# Patient Record
Sex: FEMALE | Race: WHITE | NOT HISPANIC OR LATINO | Employment: OTHER | URBAN - METROPOLITAN AREA
[De-identification: names, ages, dates, MRNs, and addresses within clinical notes are randomized per-mention and may not be internally consistent; named-entity substitution may affect disease eponyms.]

---

## 2017-11-20 ENCOUNTER — APPOINTMENT (OUTPATIENT)
Dept: RADIOLOGY | Facility: CLINIC | Age: 77
End: 2017-11-20
Payer: MEDICARE

## 2017-11-20 ENCOUNTER — TRANSCRIBE ORDERS (OUTPATIENT)
Dept: RADIOLOGY | Facility: CLINIC | Age: 77
End: 2017-11-20

## 2017-11-20 DIAGNOSIS — R52 PAIN: Primary | ICD-10-CM

## 2017-11-20 DIAGNOSIS — R52 PAIN: ICD-10-CM

## 2017-11-20 PROCEDURE — 71101 X-RAY EXAM UNILAT RIBS/CHEST: CPT

## 2021-01-22 ENCOUNTER — IMMUNIZATIONS (OUTPATIENT)
Dept: FAMILY MEDICINE CLINIC | Facility: HOSPITAL | Age: 81
End: 2021-01-22

## 2021-01-22 DIAGNOSIS — Z23 ENCOUNTER FOR IMMUNIZATION: Primary | ICD-10-CM

## 2021-01-22 PROCEDURE — 91300 SARS-COV-2 / COVID-19 MRNA VACCINE (PFIZER-BIONTECH) 30 MCG: CPT

## 2021-01-22 PROCEDURE — 0001A SARS-COV-2 / COVID-19 MRNA VACCINE (PFIZER-BIONTECH) 30 MCG: CPT

## 2021-02-10 ENCOUNTER — IMMUNIZATIONS (OUTPATIENT)
Dept: FAMILY MEDICINE CLINIC | Facility: HOSPITAL | Age: 81
End: 2021-02-10

## 2021-02-10 DIAGNOSIS — Z23 ENCOUNTER FOR IMMUNIZATION: Primary | ICD-10-CM

## 2021-02-10 PROCEDURE — 91300 SARS-COV-2 / COVID-19 MRNA VACCINE (PFIZER-BIONTECH) 30 MCG: CPT

## 2021-02-10 PROCEDURE — 0002A SARS-COV-2 / COVID-19 MRNA VACCINE (PFIZER-BIONTECH) 30 MCG: CPT

## 2021-02-17 ENCOUNTER — TRANSCRIBE ORDERS (OUTPATIENT)
Dept: ADMINISTRATIVE | Facility: HOSPITAL | Age: 81
End: 2021-02-17

## 2021-02-17 DIAGNOSIS — R52 PAIN: Primary | ICD-10-CM

## 2021-09-17 ENCOUNTER — PREPPED CHART (OUTPATIENT)
Dept: URBAN - METROPOLITAN AREA CLINIC 6 | Facility: CLINIC | Age: 81
End: 2021-09-17

## 2021-09-17 ENCOUNTER — ESTABLISHED COMPREHENSIVE EXAM (OUTPATIENT)
Dept: URBAN - METROPOLITAN AREA CLINIC 6 | Facility: CLINIC | Age: 81
End: 2021-09-17

## 2021-09-17 DIAGNOSIS — Z96.1: ICD-10-CM

## 2021-09-17 DIAGNOSIS — H25.812: ICD-10-CM

## 2021-09-17 DIAGNOSIS — H35.3210: ICD-10-CM

## 2021-09-17 DIAGNOSIS — E11.9: ICD-10-CM

## 2021-09-17 PROCEDURE — 92134 CPTRZ OPH DX IMG PST SGM RTA: CPT

## 2021-09-17 PROCEDURE — 2022F DILAT RTA XM EVC RTNOPTHY: CPT

## 2021-09-17 PROCEDURE — 3072F LOW RISK FOR RETINOPATHY: CPT

## 2021-09-17 PROCEDURE — 1036F TOBACCO NON-USER: CPT

## 2021-09-17 PROCEDURE — G8427 DOCREV CUR MEDS BY ELIG CLIN: HCPCS

## 2021-09-17 PROCEDURE — 92004 COMPRE OPH EXAM NEW PT 1/>: CPT

## 2021-09-17 ASSESSMENT — PACHYMETRY
OD_CT_UM: 18
OS_CT_UM: 18

## 2021-09-17 ASSESSMENT — VISUAL ACUITY
OS_CC: 20/100
OU_OTHER: @14""
OU_CC: J5+1
OD_CC: 20/100

## 2021-10-16 ENCOUNTER — HOSPITAL ENCOUNTER (EMERGENCY)
Facility: HOSPITAL | Age: 81
Discharge: HOME | End: 2021-10-16
Attending: EMERGENCY MEDICINE | Admitting: EMERGENCY MEDICINE
Payer: MEDICARE

## 2021-10-16 VITALS
TEMPERATURE: 97.7 F | OXYGEN SATURATION: 98 % | HEIGHT: 61 IN | BODY MASS INDEX: 27.56 KG/M2 | DIASTOLIC BLOOD PRESSURE: 74 MMHG | RESPIRATION RATE: 16 BRPM | WEIGHT: 146 LBS | SYSTOLIC BLOOD PRESSURE: 199 MMHG | HEART RATE: 77 BPM

## 2021-10-16 DIAGNOSIS — H53.9 VISUAL CHANGES: Primary | ICD-10-CM

## 2021-10-16 PROCEDURE — 99283 EMERGENCY DEPT VISIT LOW MDM: CPT

## 2021-10-16 PROCEDURE — 99284 EMERGENCY DEPT VISIT MOD MDM: CPT | Performed by: EMERGENCY MEDICINE

## 2021-10-16 RX ORDER — TETRACAINE HYDROCHLORIDE 5 MG/ML
1 SOLUTION OPHTHALMIC ONCE
Status: COMPLETED | OUTPATIENT
Start: 2021-10-16 | End: 2021-10-16

## 2021-10-16 RX ADMIN — TETRACAINE HYDROCHLORIDE 1 DROP: 5 SOLUTION OPHTHALMIC at 09:56

## 2021-10-16 RX ADMIN — FLUORESCEIN SODIUM 1 STRIP: 1 STRIP OPHTHALMIC at 09:56

## 2021-10-21 ENCOUNTER — SURGERY/PROCEDURE (OUTPATIENT)
Dept: URBAN - METROPOLITAN AREA SURGICAL CENTER 6 | Facility: SURGICAL CENTER | Age: 81
End: 2021-10-21

## 2021-10-21 DIAGNOSIS — H25.812: ICD-10-CM

## 2021-10-21 PROCEDURE — 66984 XCAPSL CTRC RMVL W/O ECP: CPT

## 2021-10-21 PROCEDURE — 0356T INSERTION OF DRUG-ELUTING IMPLANT (INCLUDING PUNCTAL DILATION AND IMPLANT REMOVAL WHEN PERFORMED) INTO LACRIMAL CANALICULUS, EACH: CPT

## 2021-10-22 ENCOUNTER — 1 DAY POST-OP (OUTPATIENT)
Dept: URBAN - METROPOLITAN AREA CLINIC 6 | Facility: CLINIC | Age: 81
End: 2021-10-22

## 2021-10-22 DIAGNOSIS — Z96.1: ICD-10-CM

## 2021-10-22 PROCEDURE — G8427 DOCREV CUR MEDS BY ELIG CLIN: HCPCS

## 2021-10-22 PROCEDURE — 99024 POSTOP FOLLOW-UP VISIT: CPT

## 2021-10-22 PROCEDURE — 1036F TOBACCO NON-USER: CPT

## 2021-10-22 ASSESSMENT — TONOMETRY
OD_IOP_MMHG: 11
OS_IOP_MMHG: 23

## 2021-10-22 ASSESSMENT — VISUAL ACUITY
OS_PH: 20/25
OS_SC: 20/30

## 2021-10-29 ENCOUNTER — 1 WEEK POST-OP (OUTPATIENT)
Dept: URBAN - METROPOLITAN AREA CLINIC 6 | Facility: CLINIC | Age: 81
End: 2021-10-29

## 2021-10-29 DIAGNOSIS — Z96.1: ICD-10-CM

## 2021-10-29 PROCEDURE — 99024 POSTOP FOLLOW-UP VISIT: CPT

## 2021-10-29 PROCEDURE — 1036F TOBACCO NON-USER: CPT

## 2021-10-29 PROCEDURE — G8427 DOCREV CUR MEDS BY ELIG CLIN: HCPCS

## 2021-10-29 ASSESSMENT — TONOMETRY
OD_IOP_MMHG: 10
OS_IOP_MMHG: 21

## 2021-10-29 ASSESSMENT — VISUAL ACUITY
OU_SC: J1
OS_SC: 20/50+2
OS_PH: 20/30+1

## 2021-11-18 ENCOUNTER — RX CHECK (OUTPATIENT)
Dept: URBAN - METROPOLITAN AREA CLINIC 6 | Facility: CLINIC | Age: 81
End: 2021-11-18

## 2021-11-18 DIAGNOSIS — H52.12: ICD-10-CM

## 2021-11-18 DIAGNOSIS — H52.4: ICD-10-CM

## 2021-11-18 DIAGNOSIS — H52.222: ICD-10-CM

## 2021-11-18 PROCEDURE — 99024 POSTOP FOLLOW-UP VISIT: CPT

## 2021-11-18 PROCEDURE — 92015 DETERMINE REFRACTIVE STATE: CPT

## 2021-11-18 PROCEDURE — 1036F TOBACCO NON-USER: CPT

## 2021-11-18 PROCEDURE — G8428 CUR MEDS NOT DOCUMENT: HCPCS

## 2021-11-18 ASSESSMENT — VISUAL ACUITY: OS_SC: 20/40+1

## 2021-12-08 ENCOUNTER — PROBLEM (OUTPATIENT)
Dept: URBAN - METROPOLITAN AREA CLINIC 6 | Facility: CLINIC | Age: 81
End: 2021-12-08

## 2021-12-08 DIAGNOSIS — H15.102: ICD-10-CM

## 2021-12-08 PROCEDURE — 1036F TOBACCO NON-USER: CPT

## 2021-12-08 PROCEDURE — 92012 INTRM OPH EXAM EST PATIENT: CPT

## 2021-12-08 PROCEDURE — G8427 DOCREV CUR MEDS BY ELIG CLIN: HCPCS

## 2021-12-08 ASSESSMENT — VISUAL ACUITY
OS_SC: 20/70+1
OD_SC: CF 1FT
OS_PH: 20/40-1

## 2021-12-08 ASSESSMENT — TONOMETRY
OD_IOP_MMHG: 13
OS_IOP_MMHG: 13

## 2021-12-17 ENCOUNTER — FOLLOW UP (OUTPATIENT)
Dept: URBAN - METROPOLITAN AREA CLINIC 6 | Facility: CLINIC | Age: 81
End: 2021-12-17

## 2021-12-17 DIAGNOSIS — B00.52: ICD-10-CM

## 2021-12-17 PROCEDURE — 1036F TOBACCO NON-USER: CPT

## 2021-12-17 PROCEDURE — G8427 DOCREV CUR MEDS BY ELIG CLIN: HCPCS

## 2021-12-17 PROCEDURE — 92012 INTRM OPH EXAM EST PATIENT: CPT

## 2021-12-17 ASSESSMENT — TONOMETRY
OD_IOP_MMHG: 10
OS_IOP_MMHG: 11

## 2021-12-17 ASSESSMENT — VISUAL ACUITY
OU_SC: J2
OS_CC: 20/30-1

## 2021-12-21 ENCOUNTER — FOLLOW UP (OUTPATIENT)
Dept: URBAN - METROPOLITAN AREA CLINIC 6 | Facility: CLINIC | Age: 81
End: 2021-12-21

## 2021-12-21 DIAGNOSIS — B00.52: ICD-10-CM

## 2021-12-21 PROCEDURE — G8427 DOCREV CUR MEDS BY ELIG CLIN: HCPCS

## 2021-12-21 PROCEDURE — 1036F TOBACCO NON-USER: CPT

## 2021-12-21 PROCEDURE — 92012 INTRM OPH EXAM EST PATIENT: CPT

## 2021-12-21 ASSESSMENT — VISUAL ACUITY
OS_SC: 20/60
OD_SC: CF 2FT
OU_SC: J1
OS_PH: 20/40

## 2021-12-21 ASSESSMENT — TONOMETRY
OS_IOP_MMHG: 13
OD_IOP_MMHG: 12

## 2021-12-28 ENCOUNTER — FOLLOW UP (OUTPATIENT)
Dept: URBAN - METROPOLITAN AREA CLINIC 6 | Facility: CLINIC | Age: 81
End: 2021-12-28

## 2021-12-28 DIAGNOSIS — B00.52: ICD-10-CM

## 2021-12-28 PROCEDURE — G8427 DOCREV CUR MEDS BY ELIG CLIN: HCPCS

## 2021-12-28 PROCEDURE — 1036F TOBACCO NON-USER: CPT

## 2021-12-28 PROCEDURE — 92012 INTRM OPH EXAM EST PATIENT: CPT

## 2021-12-28 ASSESSMENT — VISUAL ACUITY
OD_SC: CF 1FT
OS_PH: 20/25
OS_SC: 20/40

## 2021-12-28 ASSESSMENT — TONOMETRY
OS_IOP_MMHG: 16
OD_IOP_MMHG: 16

## 2022-01-04 ENCOUNTER — FOLLOW UP (OUTPATIENT)
Dept: URBAN - METROPOLITAN AREA CLINIC 6 | Facility: CLINIC | Age: 82
End: 2022-01-04

## 2022-01-04 DIAGNOSIS — B00.52: ICD-10-CM

## 2022-01-04 PROCEDURE — 92012 INTRM OPH EXAM EST PATIENT: CPT

## 2022-01-04 ASSESSMENT — TONOMETRY
OD_IOP_MMHG: 13
OS_IOP_MMHG: 11

## 2022-01-04 ASSESSMENT — VISUAL ACUITY
OS_CC: 20/40
OU_SC: J2
OS_PH: 20/30-1
OD_CC: CF 1FT

## 2022-01-19 ENCOUNTER — FOLLOW UP (OUTPATIENT)
Dept: URBAN - METROPOLITAN AREA CLINIC 6 | Facility: CLINIC | Age: 82
End: 2022-01-19

## 2022-01-19 DIAGNOSIS — Z96.1: ICD-10-CM

## 2022-01-19 DIAGNOSIS — E11.9: ICD-10-CM

## 2022-01-19 DIAGNOSIS — B00.52: ICD-10-CM

## 2022-01-19 PROCEDURE — 92012 INTRM OPH EXAM EST PATIENT: CPT

## 2022-01-19 ASSESSMENT — TONOMETRY
OS_IOP_MMHG: 13
OD_IOP_MMHG: 15

## 2022-01-19 ASSESSMENT — VISUAL ACUITY
OS_PH: 20/40+1
OU_SC: J1
OD_SC: CF 1FT
OS_SC: 20/40

## 2022-01-27 ENCOUNTER — FOLLOW UP (OUTPATIENT)
Dept: URBAN - METROPOLITAN AREA CLINIC 6 | Facility: CLINIC | Age: 82
End: 2022-01-27

## 2022-01-27 DIAGNOSIS — B00.52: ICD-10-CM

## 2022-01-27 DIAGNOSIS — Z96.1: ICD-10-CM

## 2022-01-27 DIAGNOSIS — H04.123: ICD-10-CM

## 2022-01-27 DIAGNOSIS — E11.9: ICD-10-CM

## 2022-01-27 PROCEDURE — 92012 INTRM OPH EXAM EST PATIENT: CPT

## 2022-01-27 ASSESSMENT — TONOMETRY
OS_IOP_MMHG: 14
OD_IOP_MMHG: 13

## 2022-01-27 ASSESSMENT — VISUAL ACUITY
OD_SC: CF 2FT
OS_SC: 20/40+1

## 2022-02-04 ENCOUNTER — FOLLOW UP (OUTPATIENT)
Dept: URBAN - METROPOLITAN AREA CLINIC 6 | Facility: CLINIC | Age: 82
End: 2022-02-04

## 2022-02-04 DIAGNOSIS — H04.123: ICD-10-CM

## 2022-02-04 DIAGNOSIS — B00.52: ICD-10-CM

## 2022-02-04 PROCEDURE — 92012 INTRM OPH EXAM EST PATIENT: CPT

## 2022-02-04 ASSESSMENT — TONOMETRY
OS_IOP_MMHG: 11
OD_IOP_MMHG: 13

## 2022-02-04 ASSESSMENT — VISUAL ACUITY
OS_PH: 20/25
OS_SC: 20/40

## 2022-03-01 ENCOUNTER — FOLLOW UP (OUTPATIENT)
Dept: URBAN - METROPOLITAN AREA CLINIC 6 | Facility: CLINIC | Age: 82
End: 2022-03-01

## 2022-03-01 DIAGNOSIS — H04.123: ICD-10-CM

## 2022-03-01 PROCEDURE — 92012 INTRM OPH EXAM EST PATIENT: CPT

## 2022-03-01 ASSESSMENT — TONOMETRY
OS_IOP_MMHG: 18
OD_IOP_MMHG: 18

## 2022-03-01 ASSESSMENT — VISUAL ACUITY
OS_PH: 20/30
OU_CC: J1+
OD_SC: CF 2FT
OS_SC: 20/40

## 2022-06-16 ENCOUNTER — PROBLEM (OUTPATIENT)
Dept: URBAN - METROPOLITAN AREA CLINIC 6 | Facility: CLINIC | Age: 82
End: 2022-06-16

## 2022-06-16 DIAGNOSIS — B00.52: ICD-10-CM

## 2022-06-16 DIAGNOSIS — H04.123: ICD-10-CM

## 2022-06-16 PROCEDURE — 92012 INTRM OPH EXAM EST PATIENT: CPT

## 2022-06-16 ASSESSMENT — TONOMETRY
OS_IOP_MMHG: 17
OD_IOP_MMHG: 19

## 2022-06-16 ASSESSMENT — VISUAL ACUITY
OD_SC: CF 2FT
OU_SC: J2
OS_SC: 20/40

## 2022-06-24 ENCOUNTER — FOLLOW UP (OUTPATIENT)
Dept: URBAN - METROPOLITAN AREA CLINIC 6 | Facility: CLINIC | Age: 82
End: 2022-06-24

## 2022-06-24 DIAGNOSIS — B00.52: ICD-10-CM

## 2022-06-24 DIAGNOSIS — H04.123: ICD-10-CM

## 2022-06-24 PROCEDURE — 92012 INTRM OPH EXAM EST PATIENT: CPT

## 2022-06-24 ASSESSMENT — VISUAL ACUITY
OU_SC: J1
OD_SC: CF 2FT
OS_SC: 20/40-2

## 2022-06-24 ASSESSMENT — TONOMETRY
OS_IOP_MMHG: 11
OD_IOP_MMHG: 17

## 2022-06-30 ENCOUNTER — FOLLOW UP (OUTPATIENT)
Dept: URBAN - METROPOLITAN AREA CLINIC 6 | Facility: CLINIC | Age: 82
End: 2022-06-30

## 2022-06-30 DIAGNOSIS — B00.52: ICD-10-CM

## 2022-06-30 DIAGNOSIS — H04.123: ICD-10-CM

## 2022-06-30 PROCEDURE — 92012 INTRM OPH EXAM EST PATIENT: CPT

## 2022-06-30 ASSESSMENT — TONOMETRY
OD_IOP_MMHG: 12
OS_IOP_MMHG: 10

## 2022-06-30 ASSESSMENT — VISUAL ACUITY
OD_SC: CF 2FT
OS_SC: 20/40-1

## 2022-08-04 ENCOUNTER — FOLLOW UP (OUTPATIENT)
Dept: URBAN - METROPOLITAN AREA CLINIC 6 | Facility: CLINIC | Age: 82
End: 2022-08-04

## 2022-08-04 DIAGNOSIS — B00.52: ICD-10-CM

## 2022-08-04 DIAGNOSIS — H04.123: ICD-10-CM

## 2022-08-04 DIAGNOSIS — Z96.1: ICD-10-CM

## 2022-08-04 DIAGNOSIS — E11.9: ICD-10-CM

## 2022-08-04 PROCEDURE — 92012 INTRM OPH EXAM EST PATIENT: CPT

## 2022-08-04 ASSESSMENT — VISUAL ACUITY
OS_SC: 20/40+2
OD_SC: CF 2FT

## 2022-08-04 ASSESSMENT — TONOMETRY
OD_IOP_MMHG: 13
OS_IOP_MMHG: 15

## 2022-10-24 ENCOUNTER — TRANSCRIBE ORDERS (OUTPATIENT)
Dept: PAIN MEDICINE | Facility: CLINIC | Age: 82
End: 2022-10-24

## 2022-11-16 ENCOUNTER — TELEPHONE (OUTPATIENT)
Dept: PAIN MEDICINE | Facility: CLINIC | Age: 82
End: 2022-11-16

## 2022-11-16 ENCOUNTER — HOSPITAL ENCOUNTER (OUTPATIENT)
Facility: HOSPITAL | Age: 82
Setting detail: OBSERVATION
Discharge: HOME | End: 2022-11-17
Attending: EMERGENCY MEDICINE | Admitting: INTERNAL MEDICINE

## 2022-11-16 ENCOUNTER — APPOINTMENT (EMERGENCY)
Dept: CT IMAGING | Facility: HOSPITAL | Age: 82
End: 2022-11-16

## 2022-11-16 DIAGNOSIS — R55 SYNCOPE: Primary | ICD-10-CM
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
## 2022-11-16 PROBLEM — I10 PRIMARY HYPERTENSION: Status: ACTIVE | Noted: 2022-11-16

## 2022-11-16 PROBLEM — E11.29 TYPE 2 DIABETES MELLITUS WITH RENAL COMPLICATION (HCC): Status: ACTIVE | Noted: 2022-11-16

## 2022-11-16 PROBLEM — R79.89 ELEVATED SERUM CREATININE: Status: ACTIVE | Noted: 2022-11-16

## 2022-11-16 LAB
2HR DELTA HS TROPONIN: 0 NG/L
4HR DELTA HS TROPONIN: 1 NG/L
ALBUMIN SERPL BCP-MCNC: 3.4 G/DL (ref 3.5–5)
ALP SERPL-CCNC: 35 U/L (ref 34–104)
ALT SERPL W P-5'-P-CCNC: 18 U/L (ref 7–52)
AMORPH URATE CRY URNS QL MICRO: ABNORMAL
ANION GAP SERPL CALCULATED.3IONS-SCNC: 8 MMOL/L (ref 4–13)
APTT PPP: 21 SECONDS (ref 23–37)
AST SERPL W P-5'-P-CCNC: 16 U/L (ref 13–39)
BACTERIA UR QL AUTO: ABNORMAL /HPF
BASOPHILS # BLD AUTO: 0.03 THOUSANDS/ÂΜL (ref 0–0.1)
BASOPHILS NFR BLD AUTO: 0 % (ref 0–1)
BILIRUB SERPL-MCNC: 0.6 MG/DL (ref 0.2–1)
BILIRUB UR QL STRIP: NEGATIVE
BUN SERPL-MCNC: 31 MG/DL (ref 5–25)
CALCIUM ALBUM COR SERPL-MCNC: 8.9 MG/DL (ref 8.3–10.1)
CALCIUM SERPL-MCNC: 8.4 MG/DL (ref 8.4–10.2)
CARDIAC TROPONIN I PNL SERPL HS: 4 NG/L
CARDIAC TROPONIN I PNL SERPL HS: 4 NG/L
CARDIAC TROPONIN I PNL SERPL HS: 5 NG/L
CHLORIDE SERPL-SCNC: 103 MMOL/L (ref 96–108)
CLARITY UR: ABNORMAL
CO2 SERPL-SCNC: 23 MMOL/L (ref 21–32)
COLOR UR: ABNORMAL
CREAT SERPL-MCNC: 1.41 MG/DL (ref 0.6–1.3)
D DIMER PPP FEU-MCNC: 2.92 UG/ML FEU
EOSINOPHIL # BLD AUTO: 0.09 THOUSAND/ÂΜL (ref 0–0.61)
EOSINOPHIL NFR BLD AUTO: 1 % (ref 0–6)
ERYTHROCYTE [DISTWIDTH] IN BLOOD BY AUTOMATED COUNT: 13.2 % (ref 11.6–15.1)
GFR SERPL CREATININE-BSD FRML MDRD: 34 ML/MIN/1.73SQ M
GLUCOSE SERPL-MCNC: 212 MG/DL (ref 65–140)
GLUCOSE SERPL-MCNC: 225 MG/DL (ref 65–140)
GLUCOSE SERPL-MCNC: 247 MG/DL (ref 65–140)
GLUCOSE SERPL-MCNC: 265 MG/DL (ref 65–140)
GLUCOSE UR STRIP-MCNC: ABNORMAL MG/DL
HCT VFR BLD AUTO: 38.8 % (ref 34.8–46.1)
HGB BLD-MCNC: 13.6 G/DL (ref 11.5–15.4)
HGB UR QL STRIP.AUTO: NEGATIVE
HYALINE CASTS #/AREA URNS LPF: ABNORMAL /LPF
IMM GRANULOCYTES # BLD AUTO: 0.04 THOUSAND/UL (ref 0–0.2)
IMM GRANULOCYTES NFR BLD AUTO: 1 % (ref 0–2)
INR PPP: 0.95 (ref 0.84–1.19)
KETONES UR STRIP-MCNC: NEGATIVE MG/DL
LEUKOCYTE ESTERASE UR QL STRIP: ABNORMAL
LYMPHOCYTES # BLD AUTO: 2.48 THOUSANDS/ÂΜL (ref 0.6–4.47)
LYMPHOCYTES NFR BLD AUTO: 31 % (ref 14–44)
MCH RBC QN AUTO: 30 PG (ref 26.8–34.3)
MCHC RBC AUTO-ENTMCNC: 35.1 G/DL (ref 31.4–37.4)
MCV RBC AUTO: 86 FL (ref 82–98)
MONOCYTES # BLD AUTO: 0.54 THOUSAND/ÂΜL (ref 0.17–1.22)
MONOCYTES NFR BLD AUTO: 7 % (ref 4–12)
MUCOUS THREADS UR QL AUTO: ABNORMAL
NEUTROPHILS # BLD AUTO: 4.81 THOUSANDS/ÂΜL (ref 1.85–7.62)
NEUTS SEG NFR BLD AUTO: 60 % (ref 43–75)
NITRITE UR QL STRIP: NEGATIVE
NON-SQ EPI CELLS URNS QL MICRO: ABNORMAL /HPF
NRBC BLD AUTO-RTO: 0 /100 WBCS
PH UR STRIP.AUTO: 7 [PH]
PLATELET # BLD AUTO: 187 THOUSANDS/UL (ref 149–390)
PMV BLD AUTO: 10.9 FL (ref 8.9–12.7)
POTASSIUM SERPL-SCNC: 3.8 MMOL/L (ref 3.5–5.3)
PROT SERPL-MCNC: 5.9 G/DL (ref 6.4–8.4)
PROT UR STRIP-MCNC: NEGATIVE MG/DL
PROTHROMBIN TIME: 12.9 SECONDS (ref 11.6–14.5)
RBC # BLD AUTO: 4.54 MILLION/UL (ref 3.81–5.12)
RBC #/AREA URNS AUTO: ABNORMAL /HPF
SODIUM SERPL-SCNC: 134 MMOL/L (ref 135–147)
SP GR UR STRIP.AUTO: 1.01 (ref 1–1.03)
UROBILINOGEN UR STRIP-ACNC: <2 MG/DL
WBC # BLD AUTO: 7.99 THOUSAND/UL (ref 4.31–10.16)
WBC #/AREA URNS AUTO: ABNORMAL /HPF

## 2022-11-16 RX ORDER — POTASSIUM CHLORIDE 750 MG/1
10 TABLET, EXTENDED RELEASE ORAL DAILY
Status: DISCONTINUED | OUTPATIENT
Start: 2022-11-17 | End: 2022-11-17 | Stop reason: HOSPADM

## 2022-11-16 RX ORDER — TRIAMTERENE AND HYDROCHLOROTHIAZIDE 37.5; 25 MG/1; MG/1
1 CAPSULE ORAL EVERY MORNING
COMMUNITY

## 2022-11-16 RX ORDER — LISINOPRIL 5 MG/1
5 TABLET ORAL
COMMUNITY

## 2022-11-16 RX ORDER — ASPIRIN 81 MG/1
81 TABLET, CHEWABLE ORAL
Status: DISCONTINUED | OUTPATIENT
Start: 2022-11-16 | End: 2022-11-17 | Stop reason: HOSPADM

## 2022-11-16 RX ORDER — METOPROLOL SUCCINATE 25 MG/1
25 TABLET, EXTENDED RELEASE ORAL
Status: DISCONTINUED | OUTPATIENT
Start: 2022-11-16 | End: 2022-11-17 | Stop reason: HOSPADM

## 2022-11-16 RX ORDER — GLIMEPIRIDE 4 MG/1
4 TABLET ORAL DAILY
COMMUNITY

## 2022-11-16 RX ORDER — METOPROLOL SUCCINATE 25 MG/1
25 TABLET, EXTENDED RELEASE ORAL
COMMUNITY

## 2022-11-16 RX ORDER — INSULIN LISPRO 100 [IU]/ML
1-5 INJECTION, SOLUTION INTRAVENOUS; SUBCUTANEOUS
Status: DISCONTINUED | OUTPATIENT
Start: 2022-11-16 | End: 2022-11-17 | Stop reason: HOSPADM

## 2022-11-16 RX ORDER — SODIUM CHLORIDE 9 MG/ML
75 INJECTION, SOLUTION INTRAVENOUS CONTINUOUS
Status: DISPENSED | OUTPATIENT
Start: 2022-11-16 | End: 2022-11-17

## 2022-11-16 RX ORDER — GABAPENTIN 300 MG/1
300 CAPSULE ORAL
Status: DISCONTINUED | OUTPATIENT
Start: 2022-11-16 | End: 2022-11-17 | Stop reason: HOSPADM

## 2022-11-16 RX ORDER — POTASSIUM CHLORIDE 750 MG/1
10 TABLET, FILM COATED, EXTENDED RELEASE ORAL DAILY
Status: DISCONTINUED | OUTPATIENT
Start: 2022-11-17 | End: 2022-11-16 | Stop reason: SDUPTHER

## 2022-11-16 RX ORDER — ENOXAPARIN SODIUM 100 MG/ML
30 INJECTION SUBCUTANEOUS DAILY
Status: DISCONTINUED | OUTPATIENT
Start: 2022-11-17 | End: 2022-11-17 | Stop reason: DRUGHIGH

## 2022-11-16 RX ORDER — NITROFURANTOIN MACROCRYSTALS 50 MG/1
1 CAPSULE ORAL DAILY
COMMUNITY

## 2022-11-16 RX ORDER — NITROFURANTOIN MACROCRYSTALS 50 MG/1
50 CAPSULE ORAL DAILY
Status: DISCONTINUED | OUTPATIENT
Start: 2022-11-17 | End: 2022-11-16 | Stop reason: RX

## 2022-11-16 RX ORDER — ASPIRIN 81 MG/1
81 TABLET, CHEWABLE ORAL
COMMUNITY

## 2022-11-16 RX ORDER — POTASSIUM CHLORIDE 750 MG/1
10 TABLET, FILM COATED, EXTENDED RELEASE ORAL DAILY
COMMUNITY

## 2022-11-16 RX ORDER — GABAPENTIN 300 MG/1
300 CAPSULE ORAL
COMMUNITY

## 2022-11-16 RX ADMIN — INSULIN LISPRO 2 UNITS: 100 INJECTION, SOLUTION INTRAVENOUS; SUBCUTANEOUS at 18:28

## 2022-11-16 RX ADMIN — METOPROLOL SUCCINATE 25 MG: 25 TABLET, EXTENDED RELEASE ORAL at 21:42

## 2022-11-16 RX ADMIN — ASPIRIN 81 MG 81 MG: 81 TABLET ORAL at 21:42

## 2022-11-16 RX ADMIN — SODIUM CHLORIDE 75 ML/HR: 0.9 INJECTION, SOLUTION INTRAVENOUS at 17:54

## 2022-11-16 RX ADMIN — INSULIN LISPRO 2 UNITS: 100 INJECTION, SOLUTION INTRAVENOUS; SUBCUTANEOUS at 21:48

## 2022-11-16 RX ADMIN — GABAPENTIN 300 MG: 300 CAPSULE ORAL at 21:42

## 2022-11-16 NOTE — ASSESSMENT & PLAN NOTE
No results found for: HGBA1C    Recent Labs     11/16/22  1120   POCGLU 265*       Blood Sugar Average: Last 72 hrs:  · (P) 265   · Blood glucose have been elevated in the setting of recent epidural steroid injection which per patient is typical  · Takes subcut insulin sliding scale only on p r n  basis whenever she gets the steroid injections  · Keep on SSI coverage at this time    Hold Amaryl and Januvia  · Check hemoglobin A1c

## 2022-11-16 NOTE — ASSESSMENT & PLAN NOTE
· Suspect likely underlying CKD  · Most recent creatinine on the system was in October of 2016 which was also elevated at 1 46  · Will receive IV fluids tonight  Repeat labs in the morning  · Holding lisinopril and HCTZ    If determined to be baseline, can resume lisinopril

## 2022-11-16 NOTE — ASSESSMENT & PLAN NOTE
· On lisinopril, metoprolol and Dyazide prior to admission  · Holding lisinopril and Dyazide secondary to elevated creatinine  · Blood pressure improved without intervention following initial elevation in the ED  · Continue metoprolol    Can likely resume home meds next 24 hours if renal function stable

## 2022-11-16 NOTE — ASSESSMENT & PLAN NOTE
· Occurred while shopping at [employer] earlier today  No trauma  Was guided to the floor by spouse  · Reports recurrent episodes of dizziness occurrence since most recent epidural steroid injection approximately 2 weeks ago  Episodes occur after taking insulin injection for hyperglycemia with develops post steroid  · Blood glucose over 200 at the time of syncope this afternoon reported to patient by EMS  · In the ED, blood pressures elevated to 300 systolic and blood glucose above 200  · EKG unremarkable for acute findings  Serial troponin x3 negative  · Possible orthostatic vs vasovagal event  Possibly volume component in view of elevated creatinine  · Other differentials include bradycardia/arrhythmias  · Patient placed on observation to telemetry, check orthostatics  · Started on IV fluids for elevated creatinine, repeat BMP in the morning    Hold lisinopril and HCTZ  · Check echocardiogram

## 2022-11-16 NOTE — H&P
[facility]  H&P- Georgia Salines 1940, 78 y o  female MRN: [id number]  Unit/Bed#: S -01 Encounter: [id number]  Primary Care Provider: Ravindra Scott DO   Date and time admitted to hospital: 11/16/2022 11:16 AM    * Syncope and collapse  Assessment & Plan  · Occurred while shopping at [employer] earlier today  No trauma  Was guided to the floor by spouse  · Reports recurrent episodes of dizziness occurrence since most recent epidural steroid injection approximately 2 weeks ago  Episodes occur after taking insulin injection for hyperglycemia with develops post steroid  · Blood glucose over 200 at the time of syncope this afternoon reported to patient by EMS  · In the ED, blood pressures elevated to 878 systolic and blood glucose above 200  · EKG unremarkable for acute findings  Serial troponin x3 negative  · Possible orthostatic vs vasovagal event  Possibly volume component in view of elevated creatinine  · Other differentials include bradycardia/arrhythmias  · Patient placed on observation to telemetry, check orthostatics  · Started on IV fluids for elevated creatinine, repeat BMP in the morning  Hold lisinopril and HCTZ  · Check echocardiogram    Elevated serum creatinine  Assessment & Plan  · Suspect likely underlying CKD  · Most recent creatinine on the system was in October of 2016 which was also elevated at 1 46  · Will receive IV fluids tonight  Repeat labs in the morning  · Holding lisinopril and HCTZ    If determined to be baseline, can resume lisinopril    Type 2 diabetes mellitus with renal complication [facility])  Assessment & Plan  No results found for: HGBA1C    Recent Labs     11/16/22  1120   POCGLU 265*       Blood Sugar Average: Last 72 hrs:  · (P) 265   · Blood glucose have been elevated in the setting of recent epidural steroid injection which per patient is typical  · Takes subcut insulin sliding scale only on p r n  basis whenever she gets the steroid injections  · Keep on SSI coverage at this time  Hold Amaryl and Januvia  · Check hemoglobin A1c    Primary hypertension  Assessment & Plan  · On lisinopril, metoprolol and Dyazide prior to admission  · Holding lisinopril and Dyazide secondary to elevated creatinine  · Blood pressure improved without intervention following initial elevation in the ED  · Continue metoprolol  Can likely resume home meds next 24 hours if renal function stable        VTE Prophylaxis: Enoxaparin (Lovenox)      Code Status: DNR/DNI    Anticipated Length of Stay:  Patient will be admitted on an Observation basis with an anticipated length of stay of  < 2 midnights  Justification for Hospital Stay:  Syncope    Chief Complaint:     Syncope    History of Present Illness:  Sally Jaramillo is a 78 y o  female  with PMH significant for HTN, DM, and spinal stenosis presents for evaluation of a syncopal episode  Patient experienced an episode of syncope this afternoon in the grocery store  She states that she had the feeling she was going to pass out prior to the episode where she felt very hot and lightheaded and her  had to sit her down in the aisle  She then became nauseous and very briefly, she states, lost consciousness  Patient states that she has been feeling dizzy and lightheaded for a few weeks on and off and has had the feeling before that she was going to pass out, but never actually lost consciousness  Patient typically relieves her symptoms by relaxing, having a snack, and drinking water  Patient states that she often feels symptoms similar to this every time she gets an epidural  She states that for a period of time after her epidural, she needs to use a sliding scale of insulin, Novolog   Her last epidural was 2 weeks ago and the patient has been using insulin for the entirety of the past two weeks, stating that her sugars are still high and were over 300 before dosing her insulin this AM  Other than following an epidural, the patient does not consistently use insulin and she says her DM is managed with glimepiride and Harshuvia  Patient follows with her PCP for management of her medications       Review of Systems   Constitutional: Negative for activity change, appetite change, diaphoresis, fatigue and fever  HENT: Negative  Respiratory: Negative  Cardiovascular: Negative  Gastrointestinal: Positive for nausea  Negative for abdominal pain, diarrhea and vomiting  Genitourinary: Negative  Musculoskeletal: Positive for back pain  Skin: Negative  Neurological: Positive for dizziness, syncope and light-headedness  Negative for seizures, speech difficulty, weakness and headaches  Psychiatric/Behavioral: Negative  Past Medical History:   Diagnosis Date   • Coronary artery disease    • Diabetes mellitus ([facility] [address] )    • Hypertension    • Macular degeneration disease    • Spinal stenosis        Past Surgical History:   Procedure Laterality Date   • CARDIAC OTHER     • CARDIAC SURGERY         Family History:  non-contributory    Home Meds:  all medications and allergies reviewed    Allergies: Allergies   Allergen Reactions   • Iodinated Diagnostic Agents Hives         Marital Status: /Civil Union     Social History     Substance and Sexual Activity   Alcohol Use Never     Social History     Tobacco Use   Smoking Status Never   Smokeless Tobacco Never     Social History     Substance and Sexual Activity   Drug Use Never           Physical Exam:   Vitals:   Blood Pressure: 141/73 (11/16/22 1515)  Pulse: 70 (11/16/22 1515)  Temperature: 98 °F (36 7 °C) (11/16/22 1515)  Temp Source: Oral (11/16/22 1116)  Respirations: 16 (11/16/22 1515)  Weight - Scale: 69 1 kg (152 lb 5 4 oz) (11/16/22 1118)  SpO2: 99 % (11/16/22 1515)    Physical Exam  Vitals reviewed  Constitutional:       General: She is not in acute distress  Appearance: Normal appearance  She is not ill-appearing, toxic-appearing or diaphoretic     HENT: Head: Normocephalic and atraumatic  Nose: Nose normal       Mouth/Throat:      Mouth: Mucous membranes are dry  Eyes:      General: No scleral icterus  Right eye: No discharge  Left eye: No discharge  Extraocular Movements: Extraocular movements intact  Cardiovascular:      Rate and Rhythm: Normal rate and regular rhythm  Heart sounds: Normal heart sounds  Pulmonary:      Effort: Pulmonary effort is normal  No respiratory distress  Breath sounds: No wheezing, rhonchi or rales  Abdominal:      General: There is no distension  Palpations: Abdomen is soft  There is no mass  Tenderness: There is no abdominal tenderness  Musculoskeletal:      Cervical back: Neck supple  Right lower leg: No edema  Left lower leg: No edema  Skin:     General: Skin is warm and dry  Neurological:      General: No focal deficit present  Mental Status: She is alert and oriented to person, place, and time  Mental status is at baseline  Cranial Nerves: No cranial nerve deficit  Motor: No weakness  Gait: Gait normal    Psychiatric:         Mood and Affect: Mood normal          Behavior: Behavior normal          Lab Results: I have personally reviewed pertinent reports  Results from last 7 days   Lab Units 11/16/22  1149   WBC Thousand/uL 7 99   HEMOGLOBIN g/dL 13 6   HEMATOCRIT % 38 8   PLATELETS Thousands/uL 187   NEUTROS PCT % 60   LYMPHS PCT % 31   MONOS PCT % 7   EOS PCT % 1     Results from last 7 days   Lab Units 11/16/22  1149   POTASSIUM mmol/L 3 8   CHLORIDE mmol/L 103   CO2 mmol/L 23   BUN mg/dL 31*   CREATININE mg/dL 1 41*   CALCIUM mg/dL 8 4   ALK PHOS U/L 35   ALT U/L 18   AST U/L 16     Results from last 7 days   Lab Units 11/16/22  1149   INR  0 95       Imaging: I have personally reviewed pertinent reports        CT chest abdomen pelvis wo contrast    Result Date: 11/16/2022  Narrative: CT CHEST, ABDOMEN AND PELVIS WITHOUT IV CONTRAST INDICATION:   Fall, pain in back and lower abdominal tenderness  On anticoagulation    COMPARISON:  CT abdomen pelvis 4/28/2006 TECHNIQUE: CT examination of the chest, abdomen and pelvis was performed without intravenous contrast  Axial, sagittal, and coronal 2D reformatted images were created from the source data and submitted for interpretation  Radiation dose length product (DLP) for this visit:  367 mGy-cm   This examination, like all CT scans performed in the [facility], was performed utilizing techniques to minimize radiation dose exposure, including the use of iterative reconstruction and automated exposure control  Enteric contrast was not administered  FINDINGS: CHEST LUNGS:  Lungs are clear  There is no tracheal or endobronchial lesion  PLEURA:  Mild biapical pleural calcification is present  HEART/GREAT VESSELS: Coronary artery calcification and/or stents are present  No thoracic aortic aneurysm  MEDIASTINUM AND ELLA:  Unremarkable  CHEST WALL AND LOWER NECK:  Status post median sternotomy  ABDOMEN LIVER/BILIARY TREE:  Unremarkable  GALLBLADDER:  Small calcified gallstones are noted without gallbladder wall thickening or pericholecystic fluid  No biliary ductal dilatation is appreciated  SPLEEN:  Unremarkable  PANCREAS:  Unremarkable  ADRENAL GLANDS:  Unremarkable  KIDNEYS/URETERS:  Unremarkable  No hydronephrosis  STOMACH AND BOWEL:  Moderate retained fecal material in the colon noted without bowel wall thickening or intestinal obstruction  APPENDIX:  No findings to suggest appendicitis  ABDOMINOPELVIC CAVITY:  No ascites  No pneumoperitoneum  No lymphadenopathy  VESSELS:  Atherosclerotic changes are present  No evidence of aneurysm  PELVIS REPRODUCTIVE ORGANS:  Unremarkable for patient's age  URINARY BLADDER:  Unremarkable  ABDOMINAL WALL/INGUINAL REGIONS:  Unremarkable  OSSEOUS STRUCTURES:  No acute fracture or destructive osseous lesion  Impression: 1    No acute posttraumatic abnormality identified in the chest, abdomen, or pelvis  2   Nonemergent findings as indicated  The study was marked in [facility] for immediate notification  Workstation performed: [id number]     CT head without contrast    Result Date: 11/16/2022  Narrative: CT BRAIN - WITHOUT CONTRAST INDICATION:   Syncope, recurrent Syncope, fall w/ LOC, on aspirin  COMPARISON:  CT sinus 3/22/2013 TECHNIQUE:  CT examination of the brain was performed  In addition to axial images, sagittal and coronal 2D reformatted images were created and submitted for interpretation  Radiation dose length product (DLP) for this visit:  1003 mGy-cm   This examination, like all CT scans performed in the [facility], was performed utilizing techniques to minimize radiation dose exposure, including the use of iterative reconstruction and automated exposure control  IMAGE QUALITY:  Diagnostic  FINDINGS: PARENCHYMA: Decreased attenuation is noted in periventricular and subcortical white matter demonstrating an appearance that is statistically most likely to represent mild microangiopathic change; this appearance is similar when compared to most recent prior examination  No CT signs of acute infarction  No intracranial mass, mass effect or midline shift  No acute parenchymal hemorrhage  Atherosclerotic vascular calcifications in carotid and vertebral arteries are more advanced than would be expected for the patient's  age  VENTRICLES AND EXTRA-AXIAL SPACES:  Ventricles and extra-axial CSF spaces are prominent commensurate with the degree of volume loss  No hydrocephalus  No acute extra-axial hemorrhage  VISUALIZED ORBITS AND PARANASAL SINUSES: Bilateral ocular lens implants are present  The visualized paranasal sinuses are free of mucosal disease  CALVARIUM AND EXTRACRANIAL SOFT TISSUES:  Normal      Impression: No acute intracranial abnormality  Chronic microangiopathic changes   The study was marked in [facility] for immediate notification  Workstation performed: [id number]     CT cervical spine without contrast    Result Date: 11/16/2022  Narrative: CT CERVICAL SPINE - WITHOUT CONTRAST INDICATION:   Neck trauma (Age >= 65y) Fall, LOC on anticoagulation, now w/ neck pain  COMPARISON:  None  TECHNIQUE:  CT examination of the cervical spine was performed without intravenous contrast   Contiguous axial images were obtained  Sagittal and coronal reconstructions were performed  Radiation dose length product (DLP) for this visit:  319 mGy-cm   This examination, like all CT scans performed in the [facility], was performed utilizing techniques to minimize radiation dose exposure, including the use of iterative reconstruction and automated exposure control  IMAGE QUALITY:  Diagnostic  FINDINGS: ALIGNMENT:  Normal alignment of the cervical spine  No subluxation  VERTEBRAL BODIES:  No fracture  DEGENERATIVE CHANGES:  Mild multilevel cervical degenerative changes are noted without critical central canal stenosis  PREVERTEBRAL AND PARASPINAL SOFT TISSUES:  Unremarkable  THORACIC INLET:  Mild biapical pleural calcification present  Impression: No cervical spine fracture or traumatic malalignment  The study was marked in [facility] for immediate notification  Workstation performed: [id number]       EKG, Pathology, and Other Studies Reviewed on Admission:   · Normal sinus rhythm, no acute ST-T changes    ** Please Note: Dragon 360 Dictation voice to text software may have been used in the creation of this document   **

## 2022-11-16 NOTE — APP STUDENT NOTE
JASMIN STUDENT  Inpatient Progress Note for TRAINING ONLY  Not Part of Legal Medical Record       H&P Exam - Saira Nieves 78 y o  female MRN: [id number]    Unit/Bed#: S -01 Encounter: [id number]    Assessment:  Patient is an 77 y/o F presenting today following a syncopal episode this afternoon  Patient otherwise clinically stable and feeling well upon examination today, but is nervous that she will have another episode  Prior to syncope, patient states that she felt very hot, light-headed and nauseous and then subsequently, after her  sat her down, lost consciousness very briefly  Patient to be admitted inpatient for observation and additional testing  1  Syncope  2  DM  3  HTN  4  Spinal Stenosis    Plan:  1  Orthostatic blood pressures to be checked, monitor HR, vitals, and glucose overnight  Re-check labs CMP/CBC in the AM    2  Start sliding scale insulin, d/c home oral DM medications, glimepiride & Januvia  Monitor glucose levels overnight, A1C ordered  3  Hold lisinopril 5 mg tablet and Dyazide 37 5-25 mg capusle due to increased Cr at 1 41    4  Patient to following outpatient for management of spinal stenosis with epidural therapy scheduled every 4 months    History of Present Illness   Patient is an 77 y/o F with PMH significant for HTN, DM, and spinal stenosis presents for evaluation of a syncopal episode  Patient experienced an episode of syncope this afternoon in the grocery store  She states that she had the feeling she was going to pass out prior to the episode where she felt very hot and lightheaded and her  had to sit her down in the aisle  She then became nauseous and very briefly, she states, lost consciousness  Patient states that she has been feeling dizzy and lightheaded for a few weeks on and off and has had the feeling before that she was going to pass out, but never actually lost consciousness   Patient typically relieves her symptoms by relaxing, having a snack, and drinking water  Patient states that she often feels symptoms similar to this every time she gets an epidural  She states that for a period of time after her epidural, she needs to use a sliding scale of insulin, Novolog  Her last epidural was 2 weeks ago and the patient has been using insulin for the entirety of the past two weeks, stating that her sugars are still high and were over 300 before dosing her insulin this AM  Other than following an epidural, the patient does not consistently use insulin and she says her DM is managed with glimepiride and Januvia  Patient follows with her PCP for management of her medications  Patient has a sensitivity to iodine dye used for CT scan  ROS:    Review of Systems   Constitutional: Negative for activity change, chills and fever  Patient noted that she got very hot prior to her syncopal episode which has since resolved  HENT: Positive for hearing loss  Negative for congestion and tinnitus  Patient reports that she "can't hear as good as she used to" but does not note any acute hearing loss or recent changes  Eyes: Positive for visual disturbance  Patient has macular degeneration, this is chronic and she has not noted any recent changes in her vision  Respiratory: Negative for cough, chest tightness, shortness of breath and wheezing  Cardiovascular: Negative for chest pain, palpitations and leg swelling  Gastrointestinal: Positive for nausea  Negative for abdominal distention, abdominal pain, blood in stool, constipation, diarrhea and vomiting  Reports nausea earlier today prior to syncopal episode, this has since resolved  Genitourinary: Negative for difficulty urinating, dysuria, flank pain, frequency, hematuria and urgency  Musculoskeletal: Positive for back pain  Negative for arthralgias and myalgias          Patient has chronic back pain due to spinal stenosis   Neurological: Positive for dizziness, syncope and light-headedness  Negative for tremors, speech difficulty and headaches  Patient reports dizziness and light-headedness earlier today but denies currently  Psychiatric/Behavioral: Negative for confusion  Historical Information   Past Medical History:   Diagnosis Date   • Coronary artery disease    • Diabetes mellitus ([address] )    • Hypertension    • Macular degeneration disease    • Spinal stenosis      Past Surgical History:   Procedure Laterality Date   • CARDIAC OTHER     • CARDIAC SURGERY       Social History   Social History     Substance and Sexual Activity   Alcohol Use Never     Social History     Substance and Sexual Activity   Drug Use Never     Social History     Tobacco Use   Smoking Status Never   Smokeless Tobacco Never     Family History: No family history on file  Meds/Allergies   all medications and allergies reviewed  Allergies   Allergen Reactions   • Iodinated Diagnostic Agents Hives       Objective   First Vitals:   Blood Pressure: 136/64 (11/16/22 1116)  Pulse: 59 (11/16/22 1116)  Temperature: 97 7 °F (36 5 °C) (11/16/22 1116)  Temp Source: Oral (11/16/22 1116)  Respirations: 20 (11/16/22 1116)  Weight - Scale: 69 1 kg (152 lb 5 4 oz) (11/16/22 1118)  SpO2: 100 % (11/16/22 1116)    Current Vitals:   Blood Pressure: 141/73 (11/16/22 1515)  Pulse: 70 (11/16/22 1515)  Temperature: 98 °F (36 7 °C) (11/16/22 1515)  Temp Source: Oral (11/16/22 1116)  Respirations: 16 (11/16/22 1515)  Weight - Scale: 69 1 kg (152 lb 5 4 oz) (11/16/22 1118)  SpO2: 99 % (11/16/22 1515)    No intake or output data in the 24 hours ending 11/16/22 1542    Invasive Devices     Peripheral Intravenous Line  Duration           Peripheral IV 11/16/22 Dorsal (posterior); Right Hand <1 day                Physical Exam: Physical Exam  Constitutional:       General: She is not in acute distress  Appearance: Normal appearance  She is not ill-appearing  HENT:      Head: Normocephalic and atraumatic  Cardiovascular:      Rate and Rhythm: Normal rate and regular rhythm  Pulses: Normal pulses  Heart sounds: Normal heart sounds  No murmur heard  No friction rub  No gallop  Pulmonary:      Effort: Pulmonary effort is normal       Breath sounds: Normal breath sounds  No wheezing, rhonchi or rales  Abdominal:      General: Abdomen is flat  Bowel sounds are normal  There is no distension  Palpations: Abdomen is soft  Tenderness: There is abdominal tenderness  There is no guarding  Comments: Slight lower abdominal tenderness to palpation   Musculoskeletal:      Right lower leg: No edema  Left lower leg: No edema  Skin:     General: Skin is warm and dry  Coloration: Skin is not jaundiced  Findings: No erythema or rash  Neurological:      General: No focal deficit present  Mental Status: She is alert and oriented to person, place, and time  Psychiatric:         Mood and Affect: Mood normal          Behavior: Behavior normal         Lab Results:  Sodium - 134  BUN - 31  Creatinine - 1 41  Glucose - 247   Total protein - 5 9  Albumin - 3 4  Troponin - 4   PTT - 21  D-Dimer - 2 92    Imaging:   CT head, CT cervical spine, and CT chest/abd/pelvis all unremarkable, showing no acute abnormalities     EKG, Pathology, and Other Studies: EKG shows normal sinus rhythm    Code Status: No Order  Advance Directive and Living Will:      Power of :    POLST:

## 2022-11-17 ENCOUNTER — APPOINTMENT (OUTPATIENT)
Dept: NON INVASIVE DIAGNOSTICS | Facility: HOSPITAL | Age: 82
End: 2022-11-17

## 2022-11-17 VITALS
WEIGHT: 152 LBS | BODY MASS INDEX: 28.7 KG/M2 | OXYGEN SATURATION: 96 % | RESPIRATION RATE: 18 BRPM | TEMPERATURE: 98.3 F | HEIGHT: 61 IN | DIASTOLIC BLOOD PRESSURE: 63 MMHG | HEART RATE: 68 BPM | SYSTOLIC BLOOD PRESSURE: 143 MMHG

## 2022-11-17 PROBLEM — R55 SYNCOPE AND COLLAPSE: Status: RESOLVED | Noted: 2022-11-16 | Resolved: 2022-11-17

## 2022-11-17 PROBLEM — R79.89 ELEVATED SERUM CREATININE: Status: RESOLVED | Noted: 2022-11-16 | Resolved: 2022-11-17

## 2022-11-17 LAB
ANION GAP SERPL CALCULATED.3IONS-SCNC: 4 MMOL/L (ref 4–13)
AORTIC ROOT: 2.7 CM
APICAL FOUR CHAMBER EJECTION FRACTION: 62 %
ASCENDING AORTA: 3.2 CM
ATRIAL RATE: 67 BPM
BUN SERPL-MCNC: 23 MG/DL (ref 5–25)
CALCIUM SERPL-MCNC: 6.5 MG/DL (ref 8.4–10.2)
CHLORIDE SERPL-SCNC: 112 MMOL/L (ref 96–108)
CO2 SERPL-SCNC: 21 MMOL/L (ref 21–32)
CREAT SERPL-MCNC: 1.01 MG/DL (ref 0.6–1.3)
E WAVE DECELERATION TIME: 239 MS
EST. AVERAGE GLUCOSE BLD GHB EST-MCNC: 194 MG/DL
FRACTIONAL SHORTENING: 33 % (ref 28–44)
GFR SERPL CREATININE-BSD FRML MDRD: 51 ML/MIN/1.73SQ M
GLUCOSE SERPL-MCNC: 216 MG/DL (ref 65–140)
GLUCOSE SERPL-MCNC: 230 MG/DL (ref 65–140)
GLUCOSE SERPL-MCNC: 246 MG/DL (ref 65–140)
HBA1C MFR BLD: 8.4 %
INTERVENTRICULAR SEPTUM IN DIASTOLE (PARASTERNAL SHORT AXIS VIEW): 0.9 CM
INTERVENTRICULAR SEPTUM: 0.9 CM (ref 0.6–1.1)
LAAS-AP2: 18.2 CM2
LAAS-AP4: 16.6 CM2
LEFT ATRIUM SIZE: 3.4 CM
LEFT INTERNAL DIMENSION IN SYSTOLE: 2.7 CM (ref 2.1–4)
LEFT VENTRICULAR INTERNAL DIMENSION IN DIASTOLE: 4 CM (ref 3.5–6)
LEFT VENTRICULAR POSTERIOR WALL IN END DIASTOLE: 1.1 CM
LEFT VENTRICULAR STROKE VOLUME: 42 ML
LVSV (TEICH): 42 ML
MV E'TISSUE VEL-SEP: 9 CM/S
MV PEAK A VEL: 1.09 M/S
MV PEAK E VEL: 102 CM/S
MV STENOSIS PRESSURE HALF TIME: 69 MS
MV VALVE AREA P 1/2 METHOD: 3.19 CM2
P AXIS: 89 DEGREES
POTASSIUM SERPL-SCNC: 3.5 MMOL/L (ref 3.5–5.3)
PR INTERVAL: 146 MS
QRS AXIS: 69 DEGREES
QRSD INTERVAL: 86 MS
QT INTERVAL: 374 MS
QTC INTERVAL: 395 MS
RIGHT ATRIUM AREA SYSTOLE A4C: 10.8 CM2
RIGHT VENTRICLE ID DIMENSION: 2.9 CM
SL CV LEFT ATRIUM LENGTH A2C: 5.8 CM
SL CV LV EF: 65
SL CV PED ECHO LEFT VENTRICLE DIASTOLIC VOLUME (MOD BIPLANE) 2D: 68 ML
SL CV PED ECHO LEFT VENTRICLE SYSTOLIC VOLUME (MOD BIPLANE) 2D: 27 ML
SODIUM SERPL-SCNC: 137 MMOL/L (ref 135–147)
T WAVE AXIS: 65 DEGREES
TR MAX PG: 27 MMHG
TR PEAK VELOCITY: 2.6 M/S
TRICUSPID VALVE PEAK REGURGITATION VELOCITY: 2.61 M/S
VENTRICULAR RATE: 67 BPM

## 2022-11-17 RX ORDER — LISINOPRIL 5 MG/1
5 TABLET ORAL
Status: DISCONTINUED | OUTPATIENT
Start: 2022-11-17 | End: 2022-11-17 | Stop reason: HOSPADM

## 2022-11-17 RX ORDER — ENOXAPARIN SODIUM 100 MG/ML
40 INJECTION SUBCUTANEOUS
Status: DISCONTINUED | OUTPATIENT
Start: 2022-11-18 | End: 2022-11-17 | Stop reason: HOSPADM

## 2022-11-17 RX ORDER — MINERAL OIL AND PETROLATUM 150; 830 MG/G; MG/G
OINTMENT OPHTHALMIC
Status: DISCONTINUED | OUTPATIENT
Start: 2022-11-17 | End: 2022-11-17

## 2022-11-17 RX ORDER — TRIAMTERENE AND HYDROCHLOROTHIAZIDE 37.5; 25 MG/1; MG/1
1 TABLET ORAL EVERY MORNING
Status: DISCONTINUED | OUTPATIENT
Start: 2022-11-17 | End: 2022-11-17 | Stop reason: HOSPADM

## 2022-11-17 RX ADMIN — INSULIN LISPRO 2 UNITS: 100 INJECTION, SOLUTION INTRAVENOUS; SUBCUTANEOUS at 11:51

## 2022-11-17 RX ADMIN — TRIAMTERENE AND HYDROCHLOROTHIAZIDE 1 TABLET: 37.5; 25 TABLET ORAL at 11:50

## 2022-11-17 RX ADMIN — POTASSIUM CHLORIDE 10 MEQ: 750 TABLET, EXTENDED RELEASE ORAL at 07:49

## 2022-11-17 RX ADMIN — ENOXAPARIN SODIUM 30 MG: 30 INJECTION SUBCUTANEOUS at 07:49

## 2022-11-17 RX ADMIN — Medication: at 05:34

## 2022-11-17 RX ADMIN — INSULIN LISPRO 2 UNITS: 100 INJECTION, SOLUTION INTRAVENOUS; SUBCUTANEOUS at 07:54

## 2022-11-17 NOTE — ED ATTENDING ATTESTATION
11/16/2022  ICamila MD, saw and evaluated the patient  I have discussed the patient with the resident/non-physician practitioner and agree with the resident's/non-physician practitioner's findings, Plan of Care, and MDM as documented in the resident's/non-physician practitioner's note, except where noted  All available labs and Radiology studies were reviewed  I was present for key portions of any procedure(s) performed by the resident/non-physician practitioner and I was immediately available to provide assistance  At this point I agree with the current assessment done in the Emergency Department  I have conducted an independent evaluation of this patient a history and physical is as follows:    49-year-old female presents to the emergency department for evaluation [place]  She appreciated lightheadedness preceding fall backwards with head strike and LOC  EMS reported that after gaining alertness she had 2 more unresponsive episodes while on the ground  Patient does not specific recollection of this  She relates that she was lightheaded yesterday and had improvement with sitting/rest   She denies having any abnormal sensation in the chest including dyspnea  No pain time of evaluation  Reports having been in her usual state of health with normal appetite/intake and no diarrhea  She is currently on steroids and maintains glucose control with insulin use  Glucose was in the 300s this morning and in the 200s for EMS  On exam she is well-appearing with moist mucous membranes  No conjunctival pallor or icterus  Heart sounds regular  Lungs clear to auscultation bilaterally  Extremities nontender and nonedematous with strong peripheral pulses  There is mild-to-moderate tenderness over the lower abdomen without guarding  No overlying skin changes    She does have posterior cervical, thoracic and lumbar tenderness without overlying skin changes  Obtaining CT images to assess for injury  Obtain EKG, troponin and additional labs in consideration what may have led to syncope      ED Course         Critical Care Time  Procedures

## 2022-11-17 NOTE — DISCHARGE INSTR - AVS FIRST PAGE
Follow up with PCP in 1 week  Follow up with cardiologist    We will call you if the urine culture grows any bacteria that need antibiotics  Important to do gradual position changes and stay hydrated

## 2022-11-17 NOTE — ASSESSMENT & PLAN NOTE
· On lisinopril, metoprolol and Dyazide prior to admission  · Blood pressure improved without intervention following initial elevation in the ED  · Continue metoprolol, lisinopril and Dyazide

## 2022-11-17 NOTE — ASSESSMENT & PLAN NOTE
· Resolved  · Possible underlying CKD  Most recent creatinine on the system was in October of 2016 which was also elevated at 1 46  · Received IV fluids, creatinine down to 1 01  · Restarted lisinopril and HCTZ

## 2022-11-17 NOTE — DISCHARGE SUMMARY
[facility]  Discharge- Jennifer Barry 1940, 78 y o  female MRN: [id number]  Unit/Bed#: S -01 Encounter: [id number]  Primary Care Provider: Penny Pulido DO   Date and time admitted to hospital: 11/16/2022 11:16 AM    * Syncope and collapse  Assessment & Plan  · Occurred while shopping at [employer] on 11/16  No trauma  Was guided to the floor by spouse  · Reports recurrent episodes of dizziness occurrence since most recent epidural steroid injection approximately 2 weeks ago  Episodes occur after taking insulin injection for hyperglycemia which develops post steroid  Blood sugar 200 at [employer]  · EKG unremarkable for acute findings  Serial troponin x3 negative  Telemetry with NSR  D-dimer 2 92 without symptoms of PE, stroke, DVT  Suspected elevated due to age, CHF, injury  CT imaging head and chest showed no acute findings  · Possible orthostatic vs vasovagal event  · Started on IV fluids for elevated creatinine which has resolved  · Echo: EF 65%, mild regurg with the aortic and mitral valve  · Follow up with PCP in 1 week  Follow up with cardiologist as needed  · Urine culture pending, will call with results if patient needs antibiotics  Elevated serum creatinine  Assessment & Plan  · Resolved  · Possible underlying CKD  Most recent creatinine on the system was in October of 2016 which was also elevated at 1 46  · Received IV fluids, creatinine down to 1 01  · Restarted lisinopril and HCTZ       Type 2 diabetes mellitus with renal complication [facility])  Assessment & Plan  Lab Results   Component Value Date    HGBA1C 8 4 (H) 11/16/2022       Recent Labs     11/16/22  1120 11/16/22  1807 11/16/22  2147 11/17/22  0752   POCGLU 265* 212* 225* 246*       Blood Sugar Average: Last 72 hrs:  · (P) 237   · Blood glucose have been elevated in the setting of recent epidural steroid injection which per patient is typical  · Takes subcut insulin sliding scale only on p r n  basis whenever she gets the steroid injections  · Restart home Amaryl and Januvia after discharge    Primary hypertension  Assessment & Plan  · On lisinopril, metoprolol and Dyazide prior to admission  · Blood pressure improved without intervention following initial elevation in the ED  · Continue metoprolol, lisinopril and Dyazide  Medical Problems     Resolved Problems  Date Reviewed: 11/17/2022   None       Discharging Physician / Practitioner: Cristofer Escobar PA-C  PCP: Ying Sandoval DO  Admission Date:   Admission Orders (From admission, onward)     Ordered        11/16/22 1400  Place in Observation  Once                      Discharge Date: 11/17/22    Consultations During Hospital Stay:  · None    Procedures Performed:   · CT chest abdomen pelvis  · CT cervical spine  · CT head  · Echocardiogram    Significant Findings / Test Results:   · CT chest abdomen pelvis wo contrast: no acute posttraumatic abnormality identified in chest, abdomen, or pelvis  Nonemergent findings as indicated  PLEURA:  Mild biapical pleural calcification is present  HEART/GREAT VESSELS: Coronary artery calcification and/or stents are present  No thoracic aortic aneurysm  GALLBLADDER:  Small calcified gallstones are noted without gallbladder wall thickening or pericholecystic fluid  No biliary ductal dilatation is appreciated  · CT cervical spine wo contrast: No cervical spine fracture or traumatic malalignment  · CT head wo contrast: No acute intracranial abnormality  Chronic microangiopathic changes  · Echocardiogram: Left Ventricle: Left ventricular cavity size is normal  Wall thickness is normal  The left ventricular ejection fraction is 65%  Systolic function is normal  Wall motion is normal  Diastolic function is normal  Aortic Valve: There is mild regurgitation  Mitral Valve: There is mild regurgitation  · A1c: 8 4    Incidental Findings:   · PLEURA:  Mild biapical pleural calcification is present   HEART/GREAT VESSELS: Coronary artery calcification and/or stents are present  No thoracic aortic aneurysm  GALLBLADDER:  Small calcified gallstones are noted without gallbladder wall thickening or pericholecystic fluid  No biliary ductal dilatation is appreciated  · I reviewed the above mentioned incidental findings with the patient and/or family and they expressed understanding  Test Results Pending at Discharge (will require follow up):   · Urine culture - told patient we will call if it grows any bacteria and if antibiotics are needed  Outpatient Tests Requested:  · Follow up with PCP in 1 week to discuss syncope  · Follow up with PCP to order imaging for the above findings  · Follow up with cardiologist as needed  Complications:  None    Reason for Admission: syncope    Hospital Course:   Varsha Conte is a 78 y o  female patient who originally presented to the hospital on 11/16/2022 due to a syncopal episode that occurred while shopping at [employer]  Patient in the ED reports that she has had episodes of dizziness in the past since using insulin, but this was her first syncopal episode  Workup revealed that patient's glucose in the ED was elevated at 265 and BP was found to be 185/75  Her BP resolved without intervention  D-dimer was positive at 2 92  Patient without symptoms of PE, stroke, or DVT  Imaging was obtained and was negative for acute changes  Nonemergent findings as mentioned above  Patient was placed on telemetry and an echo was ordered  Placed on IV fluids due to elevated creatinine and unsure patient's baseline  Creatinine improved over night and patient was without issues  Telemetry with NSR  Echo showed EF of 65% with some mild regurgitation of the aortic and mitral valve  Recommended that patient follows up with her PCP regarding her diabetes medication regimen and for follow up of syncope episode   Also recommended she follows up due to the chronic findings and if PCP believes further workup or imaging is warranted  Recommended following up with her cardiologist as well  Please see above list of diagnoses and related plan for additional information  Condition at Discharge: stable    Discharge Day Visit / Exam:   Subjective:  Ms Ty Au is doing well this morning and had no acute events overnight  She states that she feels great  She denies lightheadedness, dizziness, CP, SOB, headache, pain anywhere on the body  She expresses that she wants to go home  Vitals: Blood Pressure: 143/63 (11/17/22 1125)  Pulse: 68 (11/17/22 1125)  Temperature: 98 3 °F (36 8 °C) (11/17/22 0737)  Temp Source: Oral (11/16/22 1116)  Respirations: 18 (11/17/22 0737)  Height: 5' 1" (154 9 cm) (11/17/22 1125)  Weight - Scale: 68 9 kg (152 lb) (11/17/22 1125)  SpO2: 96 % (11/17/22 0737)  Exam:   Physical Exam  Vitals reviewed  Constitutional:       General: She is not in acute distress  Appearance: Normal appearance  She is not ill-appearing or toxic-appearing  HENT:      Head: Normocephalic and atraumatic  Nose: Nose normal       Mouth/Throat:      Mouth: Mucous membranes are moist    Eyes:      Pupils: Pupils are equal, round, and reactive to light  Cardiovascular:      Rate and Rhythm: Normal rate and regular rhythm  Heart sounds: Normal heart sounds  Pulmonary:      Effort: No respiratory distress  Breath sounds: Normal breath sounds  No wheezing  Abdominal:      General: Bowel sounds are normal  There is no distension  Palpations: Abdomen is soft  Tenderness: There is no abdominal tenderness  Musculoskeletal:         General: Normal range of motion  Cervical back: Normal range of motion and neck supple  Right lower leg: Edema present  Left lower leg: Edema present  Comments: 1+ edema bilaterally  No pain on palpation of the legs bilaterally  Skin:     General: Skin is warm and dry  Neurological:      General: No focal deficit present        Mental Status: She is alert and oriented to person, place, and time  Psychiatric:         Mood and Affect: Mood normal          Behavior: Behavior normal         Discussion with Family: Patient declined call to   She is contacting her son  Discharge instructions/Information to patient and family:   See after visit summary for information provided to patient and family  Provisions for Follow-Up Care:  See after visit summary for information related to follow-up care and any pertinent home health orders  Disposition:   Home    Planned Readmission: No     Discharge Statement:  I spent 48 minutes discharging the patient  This time was spent on the day of discharge  I had direct contact with the patient on the day of discharge  Greater than 50% of the total time was spent examining patient, answering all patient questions, arranging and discussing plan of care with patient as well as directly providing post-discharge instructions  Additional time then spent on discharge activities  Discharge Medications:  See after visit summary for reconciled discharge medications provided to patient and/or family        **Please Note: This note may have been constructed using a voice recognition system**

## 2022-11-17 NOTE — ASSESSMENT & PLAN NOTE
· Occurred while shopping at [employer] on 11/16  No trauma  Was guided to the floor by spouse  · Reports recurrent episodes of dizziness occurrence since most recent epidural steroid injection approximately 2 weeks ago  Episodes occur after taking insulin injection for hyperglycemia which develops post steroid  Blood sugar 200 at [employer]  · EKG unremarkable for acute findings  Serial troponin x3 negative  Telemetry with NSR  D-dimer 2 92 without symptoms of PE, stroke, DVT  Suspected elevated due to age, CHF, injury  CT imaging head and chest showed no acute findings  · Possible orthostatic vs vasovagal event  · Started on IV fluids for elevated creatinine which has resolved  · Echo: EF 65%, mild regurg with the aortic and mitral valve  · Follow up with PCP in 1 week  Follow up with cardiologist as needed  · Urine culture pending, will call with results if patient needs antibiotics

## 2022-11-17 NOTE — ASSESSMENT & PLAN NOTE
Lab Results   Component Value Date    HGBA1C 8 4 (H) 11/16/2022       Recent Labs     11/16/22  1120 11/16/22  1807 11/16/22  2147 11/17/22  0752   POCGLU 265* 212* 225* 246*       Blood Sugar Average: Last 72 hrs:  · (P) 237   · Blood glucose have been elevated in the setting of recent epidural steroid injection which per patient is typical  · Takes subcut insulin sliding scale only on p r n  basis whenever she gets the steroid injections  · Restart home Amaryl and Januvia after discharge

## 2022-11-18 LAB — BACTERIA UR CULT: NORMAL

## 2022-12-08 ENCOUNTER — FOLLOW UP (OUTPATIENT)
Dept: URBAN - METROPOLITAN AREA CLINIC 6 | Facility: CLINIC | Age: 82
End: 2022-12-08

## 2022-12-08 DIAGNOSIS — B00.52: ICD-10-CM

## 2022-12-08 DIAGNOSIS — Z96.1: ICD-10-CM

## 2022-12-08 DIAGNOSIS — H04.123: ICD-10-CM

## 2022-12-08 DIAGNOSIS — H35.3210: ICD-10-CM

## 2022-12-08 DIAGNOSIS — E11.9: ICD-10-CM

## 2022-12-08 PROCEDURE — 92012 INTRM OPH EXAM EST PATIENT: CPT

## 2022-12-08 ASSESSMENT — TONOMETRY
OD_IOP_MMHG: 13
OS_IOP_MMHG: 10

## 2022-12-08 ASSESSMENT — VISUAL ACUITY
OS_SC: 20/30
OD_SC: CF 1FT
OU_SC: J2

## 2023-02-06 ENCOUNTER — OFFICE VISIT (OUTPATIENT)
Dept: PAIN MEDICINE | Facility: CLINIC | Age: 83
End: 2023-02-06

## 2023-02-06 VITALS
SYSTOLIC BLOOD PRESSURE: 166 MMHG | WEIGHT: 147 LBS | DIASTOLIC BLOOD PRESSURE: 96 MMHG | HEART RATE: 72 BPM | BODY MASS INDEX: 27.78 KG/M2

## 2023-02-06 DIAGNOSIS — M51.16 INTERVERTEBRAL DISC DISORDER WITH RADICULOPATHY OF LUMBAR REGION: Primary | ICD-10-CM

## 2023-02-06 DIAGNOSIS — M48.061 DEGENERATIVE LUMBAR SPINAL STENOSIS: ICD-10-CM

## 2023-02-06 NOTE — PATIENT INSTRUCTIONS
Lumbar Spinal Stenosis   WHAT YOU NEED TO KNOW:   What is lumbar spinal stenosis? Lumbar spinal stenosis is narrowing of the spinal canal in your lower back  Your spinal canal is the opening in your spine that contains your spinal cord  When your spinal canal narrows, it may put pressure on your spinal cord and nerves  What causes lumbar spinal stenosis? Narrowing of your spinal canal may be caused by changes that happen as you age  These changes include bone spurs (growths), herniated discs, and thickened ligaments  Bone growths can be caused by osteoarthritis  A herniated disc bulges out between the vertebrae (bones) and into your spinal canal  Discs are spongy cushions between the vertebrae in your spine  Herniated discs may be caused by activities that increase stress on the spine  An example is heavy lifting  Ligaments that connect the vertebrae may thicken and harden as you get older  Other conditions, such as spinal injuries and Paget's disease, can also cause spinal stenosis  What are the signs and symptoms of lumbar spinal stenosis? Signs and symptoms may start or get worse when you stand or walk  They are often relieved when you sit or lean forward  Low back pain    Pain, numbness, tingling, or weakness in one or both legs    Pain in your buttocks that extends to your thighs or legs    How is lumbar spinal stenosis diagnosed? Your healthcare provider will ask about your symptoms and when they started  He or she will ask if you have any medical conditions  Your provider may ask you to lift, bend, walk, sit, or reach  An x-ray, MRI or a CT may show problems in your spine that are causing spinal stenosis  You may be given contrast liquid to help the spine show up better in the pictures  Tell the healthcare provider if you have ever had an allergic reaction to contrast liquid  Do not enter the MRI room with anything metal  Metal can cause serious injury   Tell the healthcare provider if you have any metal in or on your body  How is lumbar spinal stenosis treated? NSAIDs , such as ibuprofen, help decrease swelling, pain, and fever  NSAIDs can cause stomach bleeding or kidney problems in certain people  If you take blood thinner medicine, always ask your healthcare provider if NSAIDs are safe for you  Always read the medicine label and follow directions  Acetaminophen  decreases pain and fever  It is available without a doctor's order  Ask how much to take and how often to take it  Follow directions  Read the labels of all other medicines you are using to see if they also contain acetaminophen, or ask your doctor or pharmacist  Acetaminophen can cause liver damage if not taken correctly  Do not use more than 4 grams (4,000 milligrams) total of acetaminophen in one day  Prescription pain medicine  may be given  Ask how to take this medicine safely  Muscle relaxers  help decrease pain and muscle spasms  A steroid injection  may be given to reduce inflammation  Steroid medicine is injected into the epidural space  The epidural space is between your spinal cord and vertebrae  A nerve block  is an injection of numbing medicine  You may need a nerve block if your pain is not going away, or is getting worse  Surgery  may be needed to widen your spinal canal or decrease pressure on your spinal cord  Surgery may also be done to fix damaged or injured vertebrae in your back  How can I manage my symptoms? Go to physical and occupational therapy  as directed  A physical therapist teaches you exercises to help improve movement and strength, and to decrease pain  An occupational therapist teaches you skills to help with your daily activities  Rest  when you feel it is needed  Slowly start to do more each day  Return to your daily activities as directed  Apply heat on your back for 20 to 30 minutes every 2 hours for as many days as directed  Heat helps decrease pain and muscle spasms  Apply ice  on your back for 15 to 20 minutes every hour or as directed  Use an ice pack, or put crushed ice in a plastic bag  Cover it with a towel before you apply it to your skin  Ice helps prevent tissue damage and decreases swelling and pain  When should I seek immediate care? You have pain in your leg that does not go away or gets worse  You have trouble moving your legs  You cannot control when you urinate or have a bowel movement  When should I contact my healthcare provider? You have new or worsening symptoms  Your symptoms keep you from doing your daily activities  You have questions or concerns about your condition or care  CARE AGREEMENT:   You have the right to help plan your care  Learn about your health condition and how it may be treated  Discuss treatment options with your healthcare providers to decide what care you want to receive  You always have the right to refuse treatment  The above information is an  only  It is not intended as medical advice for individual conditions or treatments  Talk to your doctor, nurse or pharmacist before following any medical regimen to see if it is safe and effective for you  © Copyright [employer] 2022 Information is for End User's use only and may not be sold, redistributed or otherwise used for commercial purposes   All illustrations and images included in CareNotes® are the copyrighted property of [employer]  or [address] [place] [address] [employer]

## 2023-02-06 NOTE — PROGRESS NOTES
Assessment  1  Intervertebral disc disorder with radiculopathy of lumbar region    2  Degenerative lumbar spinal stenosis        Plan  The patient symptoms, history/physical are consistent with pain as a result of her underlying spinal stenosis at L4-5  At this time, I discussed repeating the epidurals or injection which provided her significant relief for typically 4 months  She would like to proceed and will be scheduled for an L5 LESI   Complete risks and benefits including bleeding, infection, tissue reaction, nerve injury and allergic reaction were discussed  The approach was demonstrated using models and literature was provided  Verbal and written consent was obtained  My impressions and treatment recommendations were discussed in detail with the patient who verbalized understanding and had no further questions  Discharge instructions were provided  I personally saw and examined the patient and I agree with the above discussed plan of care  Orders Placed This Encounter   Procedures   • FL spine and pain procedure     Standing Status:   Future     Standing Expiration Date:   2/6/2027     Order Specific Question:   Reason for Exam:     Answer:   L5 LESI     Order Specific Question:   Anticoagulant hold needed? Answer:   No     New Medications Ordered This Visit   Medications   • Empagliflozin (Jardiance) 25 MG TABS     Sig: Take 25 mg by mouth every morning       History of Present Illness    Andres Baptiste is a 78 y o  female referred by Dr Deana Spann for lower back pain has been present for 2 years  Pain is moderate to severe rated 7/10 on numeric rating scale felt intermittently worse in the morning  Pain symptoms are dull, aching and pressure-like in the lower back with some weakness felt in the legs at times  Pain can radiate into the buttocks  Symptoms are aggravated standing, bending  Treatment history has included use of Voltaren gel and gabapentin    She undergoes epidural steroid injections with Dr Adele Goldmann typically every 4 months which provide significant relief for her  I have personally reviewed and/or updated the patient's past medical history, past surgical history, family history, social history, current medications, allergies, and vital signs today  Review of Systems   Constitutional: Negative for fever and unexpected weight change  HENT: Negative for trouble swallowing  Eyes: Negative for visual disturbance  Respiratory: Negative for shortness of breath and wheezing  Cardiovascular: Negative for chest pain and palpitations  Gastrointestinal: Negative for constipation, diarrhea, nausea and vomiting  Endocrine: Negative for cold intolerance, heat intolerance and polydipsia  Genitourinary: Negative for difficulty urinating and frequency  Musculoskeletal: Positive for back pain  Negative for arthralgias, gait problem, joint swelling and myalgias  Skin: Negative for rash  Neurological: Negative for dizziness, seizures, syncope, weakness and headaches  Hematological: Does not bruise/bleed easily  Psychiatric/Behavioral: Negative for dysphoric mood  All other systems reviewed and are negative  Patient Active Problem List   Diagnosis   • Type 2 diabetes mellitus with renal complication (HCC)   • Primary hypertension       Past Medical History:   Diagnosis Date   • Coronary artery disease    • Diabetes mellitus ([facility] [place] [address] )    • Hypertension    • Macular degeneration disease    • Spinal stenosis        Past Surgical History:   Procedure Laterality Date   • CARDIAC OTHER     • CARDIAC SURGERY         History reviewed  No pertinent family history      Social History     Occupational History   • Not on file   Tobacco Use   • Smoking status: Never   • Smokeless tobacco: Never   Vaping Use   • Vaping Use: Never used   Substance and Sexual Activity   • Alcohol use: Never   • Drug use: Never   • Sexual activity: Not on file       Current Outpatient Medications on File Prior to Visit   Medication Sig   • aspirin 81 mg chewable tablet Chew 81 mg daily at bedtime   • Empagliflozin (Jardiance) 25 MG TABS Take 25 mg by mouth every morning   • gabapentin (NEURONTIN) 300 mg capsule 300 mg daily at bedtime   • glimepiride (AMARYL) 4 mg tablet Take 4 mg by mouth in the morning   • lisinopril (ZESTRIL) 5 mg tablet 5 mg daily at bedtime   • metoprolol succinate (TOPROL-XL) 25 mg 24 hr tablet 25 mg daily at bedtime   • potassium chloride (Klor-Con) 10 mEq tablet 10 mEq in the morning   • sitaGLIPtin (JANUVIA) 100 mg tablet Take 100 mg by mouth daily with breakfast   • triamterene-hydrochlorothiazide (DYAZIDE) 37 5-25 mg per capsule 1 capsule every morning   • nitrofurantoin (MACRODANTIN) 50 mg capsule Take 1 capsule by mouth daily (Patient not taking: Reported on 2/6/2023)     No current facility-administered medications on file prior to visit  Allergies   Allergen Reactions   • Iodinated Contrast Media Hives       Physical Exam    /96   Pulse 72   Wt 66 7 kg (147 lb)   BMI 27 78 kg/m²     Constitutional: normal, well developed, well nourished, alert, in no distress and non-toxic and no overt pain behavior    Eyes: anicteric  HEENT: grossly intact  Neck: supple, symmetric, trachea midline and no masses   Pulmonary:even and unlabored  Cardiovascular:No edema or pitting edema present  Skin:Normal without rashes or lesions and well hydrated  Psychiatric:Mood and affect appropriate  Neurologic:Cranial Nerves II-XII grossly intact  Musculoskeletal:normal     Lumbar Spine Exam  Appearance:  Normal lordosis  Palpation/Tenderness:  no tenderness or spasm  Range of Motion:  Flexion:  No limitation  without pain  Extension:  Minimally limited  with pain  Motor Strength:  Left hip flexion:  5/5  Left hip extension:  5/5  Right hip flexion:  5/5  Right hip extension:  5/5  Left knee flexion:  5/5  Left knee extension:  5/5  Right knee flexion:  5/5  Right knee extension:  5/5  Left foot dorsiflexion:  5/5  Left foot plantar flexion:  5/5  Right foot dorsiflexion:  5/5  Right foot plantar flexion:  5/5  Reflexes:  Left Patellar:  1+   Right Patellar:  1+   Left Achilles:  1+   Right Achilles:  1+     Imaging    MRI Lumbar Spine (2/24/2021)    L1-2: No stenosis    L2-3: Degenerative facet and ligamentum flavum hypertrophy  Minimal bilateral neuroforaminal stenosis    L3-4: Disc bulge facet and ligamentum flavum hypertrophy with mild bilateral neuroforaminal stenosis    L4-5: Disc bulge with large right paracentral protrusion and severe facet and ligamentum flavum hypertrophy resulting in severe central canal and severe right and moderate left neuroforaminal stenosis    L5-S1: Moderate disc bulge with moderate ligamentum flavum hypertrophy and moderate right and moderate to severe left foraminal stenosis

## 2023-02-22 ENCOUNTER — HOSPITAL ENCOUNTER (OUTPATIENT)
Dept: RADIOLOGY | Facility: CLINIC | Age: 83
Discharge: HOME | End: 2023-02-22
Admitting: ANESTHESIOLOGY

## 2023-02-22 VITALS
SYSTOLIC BLOOD PRESSURE: 154 MMHG | TEMPERATURE: 97.6 F | OXYGEN SATURATION: 97 % | RESPIRATION RATE: 18 BRPM | DIASTOLIC BLOOD PRESSURE: 89 MMHG | HEART RATE: 70 BPM

## 2023-02-22 DIAGNOSIS — M51.16 INTERVERTEBRAL DISC DISORDER WITH RADICULOPATHY OF LUMBAR REGION: ICD-10-CM

## 2023-02-22 RX ORDER — METHYLPREDNISOLONE ACETATE 80 MG/ML
80 INJECTION, SUSPENSION INTRA-ARTICULAR; INTRALESIONAL; INTRAMUSCULAR; PARENTERAL; SOFT TISSUE ONCE
Status: COMPLETED | OUTPATIENT
Start: 2023-02-22 | End: 2023-02-22

## 2023-02-22 RX ORDER — BUPIVACAINE HCL/PF 2.5 MG/ML
2 VIAL (ML) INJECTION ONCE
Status: COMPLETED | OUTPATIENT
Start: 2023-02-22 | End: 2023-02-22

## 2023-02-22 RX ADMIN — METHYLPREDNISOLONE ACETATE 80 MG: 80 INJECTION, SUSPENSION INTRA-ARTICULAR; INTRALESIONAL; INTRAMUSCULAR; PARENTERAL; SOFT TISSUE at 09:15

## 2023-02-22 RX ADMIN — BUPIVACAINE HYDROCHLORIDE 2 ML: 2.5 INJECTION, SOLUTION EPIDURAL; INFILTRATION; INTRACAUDAL at 09:15

## 2023-02-22 RX ADMIN — GADOBUTROL 1 ML: 604.72 INJECTION INTRAVENOUS at 09:15

## 2023-02-22 NOTE — H&P
History of Present Illness:  The patient is a 78 y o  female who presents with complaints of lower back pain is here today for lumbar epidural steroid injection    Past Medical History:   Diagnosis Date   • Coronary artery disease    • Diabetes mellitus ([address] )    • Hypertension    • Macular degeneration disease    • Spinal stenosis        Past Surgical History:   Procedure Laterality Date   • CARDIAC OTHER     • CARDIAC SURGERY           Current Outpatient Medications:   •  aspirin 81 mg chewable tablet, Chew 81 mg daily at bedtime, Disp: , Rfl:   •  Empagliflozin (Jardiance) 25 MG TABS, Take 25 mg by mouth every morning, Disp: , Rfl:   •  gabapentin (NEURONTIN) 300 mg capsule, 300 mg daily at bedtime, Disp: , Rfl:   •  glimepiride (AMARYL) 4 mg tablet, Take 4 mg by mouth in the morning, Disp: , Rfl:   •  lisinopril (ZESTRIL) 5 mg tablet, 5 mg daily at bedtime, Disp: , Rfl:   •  metoprolol succinate (TOPROL-XL) 25 mg 24 hr tablet, 25 mg daily at bedtime, Disp: , Rfl:   •  nitrofurantoin (MACRODANTIN) 50 mg capsule, Take 1 capsule by mouth daily (Patient not taking: Reported on 2/6/2023), Disp: , Rfl:   •  potassium chloride (Klor-Con) 10 mEq tablet, 10 mEq in the morning, Disp: , Rfl:   •  sitaGLIPtin (JANUVIA) 100 mg tablet, Take 100 mg by mouth daily with breakfast, Disp: , Rfl:   •  triamterene-hydrochlorothiazide (DYAZIDE) 37 5-25 mg per capsule, 1 capsule every morning, Disp: , Rfl:     Current Facility-Administered Medications:   •  bupivacaine (PF) (MARCAINE) 0 25 % injection 2 mL, 2 mL, Epidural, Once, Poppy Hayden MD  •  Gadobutrol injection (SINGLE-DOSE) SOLN 1 mL, 1 mL, Intravenous, Once, Poppy Hayden MD  •  methylPREDNISolone acetate (DEPO-MEDROL) injection 80 mg, 80 mg, Epidural, Once, Poppy Hayden MD    Allergies   Allergen Reactions   • Iodinated Contrast Media Hives       Physical Exam:   Vitals:    02/22/23 0858   BP: 144/70   Pulse: 65   Resp: 18   Temp: 97 6 °F (36 4 °C)   SpO2: 95% General: Awake, Alert, Oriented x 3, Mood and affect appropriate  Respiratory: Respirations even and unlabored  Cardiovascular: Peripheral pulses intact; no edema  Musculoskeletal Exam: Lower back pain    ASA Score: 3    Patient/Chart Verification  Patient ID Verified: Verbal  ID Band Applied: No  Consents Confirmed: Procedural, To be obtained in the Pre-Procedure area  H&P( within 30 days) Verified: To be obtained in the Pre-Procedure area  Allergies Reviewed: Yes  Anticoag/NSAID held?: No  Currently on antibiotics?: No    Assessment:   1   Intervertebral disc disorder with radiculopathy of lumbar region        Plan: L5 LESI

## 2023-02-22 NOTE — DISCHARGE INSTR - LAB
Epidural Steroid Injection   WHAT YOU NEED TO KNOW:   An epidural steroid injection (TANJA) is a procedure to inject steroid medicine into the epidural space  The epidural space is between your spinal cord and vertebrae  Steroids reduce inflammation and fluid buildup in your spine that may be causing pain  You may be given pain medicine along with the steroids  ACTIVITY  Do not drive or operate machinery today  No strenuous activity today - bending, lifting, etc   You may resume normal activites starting tomorrow - start slowly and as tolerated  You may shower today, but no tub baths or hot tubs  You may have numbness for several hours from the local anesthetic  Please use caution and common sense, especially with weight-bearing activities  CARE OF THE INJECTION SITE  If you have soreness or pain, apply ice to the area today (20 minutes on/20 minutes off)  Starting tomorrow, you may use warm, moist heat or ice if needed  You may have an increase or change in your discomfort for 36-48 hours after your treatment  Apply ice and continue with any pain medication you have been prescribed  [facility] if you have any of the following: redness, drainage, swelling, headache, stiff neck or fever above 100°F     SPECIAL INSTRUCTIONS  Our office will contact you in approximately 7 days for a progress report  MEDICATIONS  Continue to take all routine medications  Our office may have instructed you to hold some medications  As no general anesthesia was used in today's procedure, you should not experience any side effects related to anesthesia  If you are diabetic, the steroids used in today's injection may temporarily increase your blood sugar levels after the first few days after your injection  Please keep a close eye on your sugars and alert the doctor who manages your diabetes if your sugars are significantly high from your baseline or you are symptomatic       If you have a problem specifically related to your procedure, please call our office at [phone]  Problems not related to your procedure should be directed to your primary care physician

## 2023-03-01 ENCOUNTER — TELEPHONE (OUTPATIENT)
Dept: RADIOLOGY | Facility: MEDICAL CENTER | Age: 83
End: 2023-03-01

## 2023-03-08 NOTE — TELEPHONE ENCOUNTER
Caller: Marlin Schuster PT  Doctor: Dr Karmen Suarez    Reason for call: Pt returned the f/u call with  85-90% improvement and pain level  2/10        Call back#: [phone]

## 2023-05-26 ENCOUNTER — TELEPHONE (OUTPATIENT)
Dept: PAIN MEDICINE | Facility: CLINIC | Age: 83
End: 2023-05-26

## 2023-05-26 NOTE — TELEPHONE ENCOUNTER
Caller: patient     Doctor: Leeland Leyden    Reason for call: pt requesting a sooner procedure date then July for her chronic low back, buttocks & bilateral leg pain. She would also like to speak to a nurse about her status.     Call back#: [phone]

## 2023-06-14 ENCOUNTER — HOSPITAL ENCOUNTER (OUTPATIENT)
Dept: RADIOLOGY | Facility: CLINIC | Age: 83
Discharge: HOME | End: 2023-06-14
Payer: MEDICARE

## 2023-06-14 VITALS
HEART RATE: 67 BPM | DIASTOLIC BLOOD PRESSURE: 65 MMHG | OXYGEN SATURATION: 98 % | SYSTOLIC BLOOD PRESSURE: 139 MMHG | RESPIRATION RATE: 18 BRPM

## 2023-06-14 DIAGNOSIS — M54.16 LUMBAR RADICULOPATHY: ICD-10-CM

## 2023-06-14 PROCEDURE — A9585 GADOBUTROL INJECTION: HCPCS | Performed by: ANESTHESIOLOGY

## 2023-06-14 PROCEDURE — 62323 NJX INTERLAMINAR LMBR/SAC: CPT | Performed by: ANESTHESIOLOGY

## 2023-06-14 RX ORDER — BUPIVACAINE HCL/PF 2.5 MG/ML
2 VIAL (ML) INJECTION ONCE
Status: COMPLETED | OUTPATIENT
Start: 2023-06-14 | End: 2023-06-14

## 2023-06-14 RX ORDER — METHYLPREDNISOLONE ACETATE 80 MG/ML
80 INJECTION, SUSPENSION INTRA-ARTICULAR; INTRALESIONAL; INTRAMUSCULAR; PARENTERAL; SOFT TISSUE ONCE
Status: COMPLETED | OUTPATIENT
Start: 2023-06-14 | End: 2023-06-14

## 2023-06-14 RX ADMIN — METHYLPREDNISOLONE ACETATE 80 MG: 80 INJECTION, SUSPENSION INTRA-ARTICULAR; INTRALESIONAL; INTRAMUSCULAR; PARENTERAL; SOFT TISSUE at 14:53

## 2023-06-14 RX ADMIN — GADOBUTROL 1 ML: 604.72 INJECTION INTRAVENOUS at 14:53

## 2023-06-14 RX ADMIN — BUPIVACAINE HYDROCHLORIDE 2 ML: 2.5 INJECTION, SOLUTION EPIDURAL; INFILTRATION; INTRACAUDAL at 14:53

## 2023-06-14 NOTE — DISCHARGE INSTR - LAB
Epidural Steroid Injection   WHAT YOU NEED TO KNOW:   An epidural steroid injection (TANJA) is a procedure to inject steroid medicine into the epidural space  The epidural space is between your spinal cord and vertebrae  Steroids reduce inflammation and fluid buildup in your spine that may be causing pain  You may be given pain medicine along with the steroids  ACTIVITY  Do not drive or operate machinery today  No strenuous activity today - bending, lifting, etc   You may resume normal activites starting tomorrow - start slowly and as tolerated  You may shower today, but no tub baths or hot tubs  You may have numbness for several hours from the local anesthetic  Please use caution and common sense, especially with weight-bearing activities  CARE OF THE INJECTION SITE  If you have soreness or pain, apply ice to the area today (20 minutes on/20 minutes off)  Starting tomorrow, you may use warm, moist heat or ice if needed  You may have an increase or change in your discomfort for 36-48 hours after your treatment  Apply ice and continue with any pain medication you have been prescribed  Notify the [facility] if you have any of the following: redness, drainage, swelling, headache, stiff neck or fever above 100°F     SPECIAL INSTRUCTIONS  Our office will contact you in approximately 7 days for a progress report  MEDICATIONS  Continue to take all routine medications  Our office may have instructed you to hold some medications  As no general anesthesia was used in today's procedure, you should not experience any side effects related to anesthesia  If you are diabetic, the steroids used in today's injection may temporarily increase your blood sugar levels after the first few days after your injection  Please keep a close eye on your sugars and alert the doctor who manages your diabetes if your sugars are significantly high from your baseline or you are symptomatic       If you have a problem specifically related to your procedure, please call our office at [phone]  Problems not related to your procedure should be directed to your primary care physician

## 2023-06-14 NOTE — H&P
History of Present Illness:  The patient is a 78 y o  female who presents with complaints of lower back pain is here today for an L5 epidural steroid injection    Past Medical History:   Diagnosis Date   • Coronary artery disease    • Diabetes mellitus ([facility] [address] )    • Hypertension    • Macular degeneration disease    • Spinal stenosis        Past Surgical History:   Procedure Laterality Date   • CARDIAC OTHER     • CARDIAC SURGERY           Current Outpatient Medications:   •  aspirin 81 mg chewable tablet, Chew 81 mg daily at bedtime, Disp: , Rfl:   •  Empagliflozin (Jardiance) 25 MG TABS, Take 25 mg by mouth every morning, Disp: , Rfl:   •  gabapentin (NEURONTIN) 300 mg capsule, 300 mg daily at bedtime, Disp: , Rfl:   •  glimepiride (AMARYL) 4 mg tablet, Take 4 mg by mouth in the morning, Disp: , Rfl:   •  lisinopril (ZESTRIL) 5 mg tablet, 5 mg daily at bedtime, Disp: , Rfl:   •  metoprolol succinate (TOPROL-XL) 25 mg 24 hr tablet, 25 mg daily at bedtime, Disp: , Rfl:   •  nitrofurantoin (MACRODANTIN) 50 mg capsule, Take 1 capsule by mouth daily (Patient not taking: Reported on 2/6/2023), Disp: , Rfl:   •  potassium chloride (Klor-Con) 10 mEq tablet, 10 mEq in the morning, Disp: , Rfl:   •  sitaGLIPtin (JANUVIA) 100 mg tablet, Take 100 mg by mouth daily with breakfast, Disp: , Rfl:   •  triamterene-hydrochlorothiazide (DYAZIDE) 37 5-25 mg per capsule, 1 capsule every morning, Disp: , Rfl:     Current Facility-Administered Medications:   •  bupivacaine (PF) (MARCAINE) 0 25 % injection 2 mL, 2 mL, Epidural, Once, Nacho Dooley MD  •  Gadobutrol injection (SINGLE-DOSE) SOLN 1 mL, 1 mL, Other, Once, Nacho Dooley MD  •  methylPREDNISolone acetate (DEPO-MEDROL) injection 80 mg, 80 mg, Epidural, Once, Nacho Dooley MD    Allergies   Allergen Reactions   • Iodinated Contrast Media Hives       Physical Exam:   Vitals:    06/14/23 1434   BP: 150/80   Pulse: 69   SpO2: 98%     General: Awake, Alert, Oriented x 3, Mood and affect appropriate  Respiratory: Respirations even and unlabored  Cardiovascular: Peripheral pulses intact; no edema  Musculoskeletal Exam: Lower back pain    ASA Score: 3    Patient/Chart Verification  Patient ID Verified: Verbal  ID Band Applied: No  Consents Confirmed: Procedural, To be obtained in the Pre-Procedure area  H&P( within 30 days) Verified: To be obtained in the Pre-Procedure area  Interval H&P(within 24 hr) Complete (required for Outpatients and Surgery Admit only): To be obtained in the Pre-Procedure area  Allergies Reviewed: Yes  Anticoag/NSAID held?: NA  Currently on antibiotics?: No    Assessment:   1   Lumbar radiculopathy        Plan: L5 LESI

## 2023-06-21 ENCOUNTER — TELEPHONE (OUTPATIENT)
Dept: RADIOLOGY | Facility: MEDICAL CENTER | Age: 83
End: 2023-06-21

## 2023-06-21 NOTE — TELEPHONE ENCOUNTER
Patient Reports   60      %     improvement post injection    Pain Level    No pain just soreness /10

## 2023-06-27 ENCOUNTER — ESTABLISHED COMPREHENSIVE EXAM (OUTPATIENT)
Dept: URBAN - METROPOLITAN AREA CLINIC 6 | Facility: CLINIC | Age: 83
End: 2023-06-27

## 2023-06-27 DIAGNOSIS — E11.9: ICD-10-CM

## 2023-06-27 DIAGNOSIS — H04.123: ICD-10-CM

## 2023-06-27 DIAGNOSIS — Z96.1: ICD-10-CM

## 2023-06-27 DIAGNOSIS — H35.3210: ICD-10-CM

## 2023-06-27 PROCEDURE — 92134 CPTRZ OPH DX IMG PST SGM RTA: CPT

## 2023-06-27 PROCEDURE — 92014 COMPRE OPH EXAM EST PT 1/>: CPT

## 2023-06-27 ASSESSMENT — VISUAL ACUITY
OU_SC: 20/30-2
OS_SC: 20/30

## 2023-06-27 ASSESSMENT — TONOMETRY
OS_IOP_MMHG: 15
OD_IOP_MMHG: 12

## 2023-10-04 ENCOUNTER — HOSPITAL ENCOUNTER (OUTPATIENT)
Dept: RADIOLOGY | Facility: CLINIC | Age: 83
Discharge: HOME | End: 2023-10-04
Payer: MEDICARE

## 2023-10-04 ENCOUNTER — TELEPHONE (OUTPATIENT)
Dept: PAIN MEDICINE | Facility: CLINIC | Age: 83
End: 2023-10-04

## 2023-10-04 VITALS
DIASTOLIC BLOOD PRESSURE: 70 MMHG | SYSTOLIC BLOOD PRESSURE: 149 MMHG | TEMPERATURE: 97.9 F | RESPIRATION RATE: 20 BRPM | HEART RATE: 64 BPM | OXYGEN SATURATION: 98 %

## 2023-10-04 DIAGNOSIS — M54.16 LUMBAR RADICULOPATHY: ICD-10-CM

## 2023-10-04 PROCEDURE — 62323 NJX INTERLAMINAR LMBR/SAC: CPT | Performed by: ANESTHESIOLOGY

## 2023-10-04 PROCEDURE — A9585 GADOBUTROL INJECTION: HCPCS | Performed by: ANESTHESIOLOGY

## 2023-10-04 RX ORDER — METHYLPREDNISOLONE ACETATE 80 MG/ML
80 INJECTION, SUSPENSION INTRA-ARTICULAR; INTRALESIONAL; INTRAMUSCULAR; PARENTERAL; SOFT TISSUE ONCE
Status: COMPLETED | OUTPATIENT
Start: 2023-10-04 | End: 2023-10-04

## 2023-10-04 RX ORDER — GADOBUTROL 604.72 MG/ML
1 INJECTION INTRAVENOUS ONCE
Status: COMPLETED | OUTPATIENT
Start: 2023-10-04 | End: 2023-10-04

## 2023-10-04 RX ORDER — BUPIVACAINE HCL/PF 2.5 MG/ML
2 VIAL (ML) INJECTION ONCE
Status: COMPLETED | OUTPATIENT
Start: 2023-10-04 | End: 2023-10-04

## 2023-10-04 RX ADMIN — METHYLPREDNISOLONE ACETATE 80 MG: 80 INJECTION, SUSPENSION INTRA-ARTICULAR; INTRALESIONAL; INTRAMUSCULAR; PARENTERAL; SOFT TISSUE at 11:19

## 2023-10-04 RX ADMIN — BUPIVACAINE HYDROCHLORIDE 2 ML: 2.5 INJECTION, SOLUTION EPIDURAL; INFILTRATION; INTRACAUDAL at 11:19

## 2023-10-04 RX ADMIN — GADOBUTROL 1 ML: 604.72 INJECTION INTRAVENOUS at 11:18

## 2023-10-04 NOTE — H&P
History of Present Illness:  The patient is a 78 y.o. female who presents with complaints of lower back pain injected for lumbar epidural steroid injection    Past Medical History:   Diagnosis Date   • Coronary artery disease    • Diabetes mellitus ([address])    • Hypertension    • Macular degeneration disease    • Spinal stenosis        Past Surgical History:   Procedure Laterality Date   • CARDIAC OTHER     • CARDIAC SURGERY           Current Outpatient Medications:   •  aspirin 81 mg chewable tablet, Chew 81 mg daily at bedtime, Disp: , Rfl:   •  Empagliflozin (Jardiance) 25 MG TABS, Take 25 mg by mouth every morning, Disp: , Rfl:   •  gabapentin (NEURONTIN) 300 mg capsule, 300 mg daily at bedtime, Disp: , Rfl:   •  glimepiride (AMARYL) 4 mg tablet, Take 4 mg by mouth in the morning, Disp: , Rfl:   •  lisinopril (ZESTRIL) 5 mg tablet, 5 mg daily at bedtime, Disp: , Rfl:   •  metoprolol succinate (TOPROL-XL) 25 mg 24 hr tablet, 25 mg daily at bedtime, Disp: , Rfl:   •  nitrofurantoin (MACRODANTIN) 50 mg capsule, Take 1 capsule by mouth daily (Patient not taking: Reported on 2/6/2023), Disp: , Rfl:   •  potassium chloride (Klor-Con) 10 mEq tablet, 10 mEq in the morning, Disp: , Rfl:   •  sitaGLIPtin (JANUVIA) 100 mg tablet, Take 100 mg by mouth daily with breakfast, Disp: , Rfl:   •  triamterene-hydrochlorothiazide (DYAZIDE) 37.5-25 mg per capsule, 1 capsule every morning, Disp: , Rfl:     Current Facility-Administered Medications:   •  bupivacaine (PF) (MARCAINE) 0.25 % injection 2 mL, 2 mL, Epidural, Once, Latasha Bee MD  •  Gadobutrol injection (SINGLE-DOSE) SOLN 1 mL, 1 mL, Other, Once, Latasha Bee MD  •  methylPREDNISolone acetate (DEPO-MEDROL) injection 80 mg, 80 mg, Epidural, Once, Latasha Bee MD    Allergies   Allergen Reactions   • Iodinated Contrast Media Hives       Physical Exam:   Vitals:    10/04/23 1052   BP: 160/80   Pulse: 63   Resp: 20   Temp: 97.9 °F (36.6 °C)   SpO2: 98%     General: Awake, Alert, Oriented x 3, Mood and affect appropriate  Respiratory: Respirations even and unlabored  Cardiovascular: Peripheral pulses intact; no edema  Musculoskeletal Exam: Lower back pain    ASA Score: 3    Patient/Chart Verification  Patient ID Verified: Verbal  Consents Confirmed: To be obtained in the Pre-Procedure area  Interval H&P(within 24 hr) Complete (required for Outpatients and Surgery Admit only): To be obtained in the Pre-Procedure area  Allergies Reviewed: Yes  Anticoag/NSAID held?: NA  Currently on antibiotics?: No    Assessment:   1.  Lumbar radiculopathy        Plan: L5 LESI

## 2023-10-04 NOTE — TELEPHONE ENCOUNTER
Patient phoned, would like to schedule her next injection for 3 1/2 months from now. The L5 LESI, which is what she had done today. She said you were ok with her repeating at that frequency, ok to proceed?

## 2023-10-04 NOTE — DISCHARGE INSTR - LAB
Epidural Steroid Injection   WHAT YOU NEED TO KNOW:   An epidural steroid injection (TANJA) is a procedure to inject steroid medicine into the epidural space. The epidural space is between your spinal cord and vertebrae. Steroids reduce inflammation and fluid buildup in your spine that may be causing pain. You may be given pain medicine along with the steroids. ACTIVITY  Do not drive or operate machinery today. No strenuous activity today - bending, lifting, etc.  You may resume normal activites starting tomorrow - start slowly and as tolerated. You may shower today, but no tub baths or hot tubs. You may have numbness for several hours from the local anesthetic. Please use caution and common sense, especially with weight-bearing activities. CARE OF THE INJECTION SITE  If you have soreness or pain, apply ice to the area today (20 minutes on/20 minutes off). Starting tomorrow, you may use warm, moist heat or ice if needed. You may have an increase or change in your discomfort for 36-48 hours after your treatment. Apply ice and continue with any pain medication you have been prescribed. Notify the [facility] if you have any of the following: redness, drainage, swelling, headache, stiff neck or fever above 100°F.    SPECIAL INSTRUCTIONS  Our office will contact you in approximately 7 days for a progress report. MEDICATIONS  Continue to take all routine medications. Our office may have instructed you to hold some medications. As no general anesthesia was used in today's procedure, you should not experience any side effects related to anesthesia. If you are diabetic, the steroids used in today's injection may temporarily increase your blood sugar levels after the first few days after your injection. Please keep a close eye on your sugars and alert the doctor who manages your diabetes if your sugars are significantly high from your baseline or you are symptomatic.      If you have a problem specifically related to your procedure, please call our office at [phone]. Problems not related to your procedure should be directed to your primary care physician.

## 2023-10-11 ENCOUNTER — TELEPHONE (OUTPATIENT)
Dept: RADIOLOGY | Facility: MEDICAL CENTER | Age: 83
End: 2023-10-11

## 2023-10-11 NOTE — TELEPHONE ENCOUNTER
Patient Reports       reports that she is doing better but cannot give any numbers right now. Her  just had emergency surgery. She will know better next week.

## 2024-01-17 ENCOUNTER — HOSPITAL ENCOUNTER (OUTPATIENT)
Dept: RADIOLOGY | Facility: CLINIC | Age: 84
Discharge: HOME | End: 2024-01-17
Payer: MEDICARE

## 2024-01-17 VITALS
HEART RATE: 63 BPM | DIASTOLIC BLOOD PRESSURE: 74 MMHG | OXYGEN SATURATION: 97 % | RESPIRATION RATE: 20 BRPM | SYSTOLIC BLOOD PRESSURE: 154 MMHG | TEMPERATURE: 97.4 F

## 2024-01-17 DIAGNOSIS — M54.16 LUMBAR RADICULOPATHY: ICD-10-CM

## 2024-01-17 PROCEDURE — 62323 NJX INTERLAMINAR LMBR/SAC: CPT | Performed by: ANESTHESIOLOGY

## 2024-01-17 PROCEDURE — A9585 GADOBUTROL INJECTION: HCPCS | Performed by: ANESTHESIOLOGY

## 2024-01-17 RX ORDER — GADOBUTROL 604.72 MG/ML
1 INJECTION INTRAVENOUS ONCE
Status: COMPLETED | OUTPATIENT
Start: 2024-01-17 | End: 2024-01-17

## 2024-01-17 RX ORDER — METHYLPREDNISOLONE ACETATE 80 MG/ML
80 INJECTION, SUSPENSION INTRA-ARTICULAR; INTRALESIONAL; INTRAMUSCULAR; PARENTERAL; SOFT TISSUE ONCE
Status: COMPLETED | OUTPATIENT
Start: 2024-01-17 | End: 2024-01-17

## 2024-01-17 RX ORDER — BUPIVACAINE HCL/PF 2.5 MG/ML
2 VIAL (ML) INJECTION ONCE
Status: COMPLETED | OUTPATIENT
Start: 2024-01-17 | End: 2024-01-17

## 2024-01-17 RX ADMIN — METHYLPREDNISOLONE ACETATE 80 MG: 80 INJECTION, SUSPENSION INTRA-ARTICULAR; INTRALESIONAL; INTRAMUSCULAR; PARENTERAL; SOFT TISSUE at 13:04

## 2024-01-17 RX ADMIN — GADOBUTROL 1 ML: 604.72 INJECTION INTRAVENOUS at 13:04

## 2024-01-17 RX ADMIN — BUPIVACAINE HYDROCHLORIDE 2 ML: 2.5 INJECTION, SOLUTION EPIDURAL; INFILTRATION; INTRACAUDAL at 13:04

## 2024-01-17 NOTE — DISCHARGE INSTR - LAB
Epidural Steroid Injection   WHAT YOU NEED TO KNOW:   An epidural steroid injection (TANJA) is a procedure to inject steroid medicine into the epidural space. The epidural space is between your spinal cord and vertebrae. Steroids reduce inflammation and fluid buildup in your spine that may be causing pain. You may be given pain medicine along with the steroids.          ACTIVITY  Do not drive or operate machinery today.  No strenuous activity today - bending, lifting, etc.  You may resume normal activites starting tomorrow - start slowly and as tolerated.  You may shower today, but no tub baths or hot tubs.  You may have numbness for several hours from the local anesthetic. Please use caution and common sense, especially with weight-bearing activities.    CARE OF THE INJECTION SITE  If you have soreness or pain, apply ice to the area today (20 minutes on/20 minutes off).  Starting tomorrow, you may use warm, moist heat or ice if needed.  You may have an increase or change in your discomfort for 36-48 hours after your treatment.  Apply ice and continue with any pain medication you have been prescribed.  Notify the [facility] if you have any of the following: redness, drainage, swelling, headache, stiff neck or fever above 100°F.    SPECIAL INSTRUCTIONS  Our office will contact you in approximately 7 days for a progress report.    MEDICATIONS  Continue to take all routine medications.  Our office may have instructed you to hold some medications.    As no general anesthesia was used in today's procedure, you should not experience any side effects related to anesthesia.     If you are diabetic, the steroids used in today's injection may temporarily increase your blood sugar levels after the first few days after your injection. Please keep a close eye on your sugars and alert the doctor who manages your diabetes if your sugars are significantly high from your baseline or you are symptomatic.     If you have a  problem specifically related to your procedure, please call our office at [phone].  Problems not related to your procedure should be directed to your primary care physician.

## 2024-01-17 NOTE — H&P
History of Present Illness: The patient is a 83 y.o. female who presents with complaints of low back pain is here today for a lumbar epidural steroid injection    Past Medical History:   Diagnosis Date    Coronary artery disease     Diabetes mellitus (HCC)     Hypertension     Macular degeneration disease     Spinal stenosis        Past Surgical History:   Procedure Laterality Date    CARDIAC OTHER      CARDIAC SURGERY           Current Outpatient Medications:     aspirin 81 mg chewable tablet, Chew 81 mg daily at bedtime, Disp: , Rfl:     Empagliflozin (Jardiance) 25 MG TABS, Take 25 mg by mouth every morning, Disp: , Rfl:     gabapentin (NEURONTIN) 300 mg capsule, 300 mg daily at bedtime, Disp: , Rfl:     glimepiride (AMARYL) 4 mg tablet, Take 4 mg by mouth in the morning, Disp: , Rfl:     lisinopril (ZESTRIL) 5 mg tablet, 5 mg daily at bedtime, Disp: , Rfl:     metoprolol succinate (TOPROL-XL) 25 mg 24 hr tablet, 25 mg daily at bedtime, Disp: , Rfl:     nitrofurantoin (MACRODANTIN) 50 mg capsule, Take 1 capsule by mouth daily (Patient not taking: Reported on 2/6/2023), Disp: , Rfl:     potassium chloride (Klor-Con) 10 mEq tablet, 10 mEq in the morning, Disp: , Rfl:     sitaGLIPtin (JANUVIA) 100 mg tablet, Take 100 mg by mouth daily with breakfast, Disp: , Rfl:     triamterene-hydrochlorothiazide (DYAZIDE) 37.5-25 mg per capsule, 1 capsule every morning, Disp: , Rfl:     Current Facility-Administered Medications:     bupivacaine (PF) (MARCAINE) 0.25 % injection 2 mL, 2 mL, Epidural, Once, Jay Meyer MD    Gadobutrol injection (SINGLE-DOSE) SOLN 1 mL, 1 mL, Other, Once, Jay Meyer MD    methylPREDNISolone acetate (DEPO-MEDROL) injection 80 mg, 80 mg, Epidural, Once, Jay Meyer MD    Allergies   Allergen Reactions    Iodinated Contrast Media Hives       Physical Exam:   Vitals:    01/17/24 1255   BP: 158/72   Pulse: 63   Resp: 20   Temp: (!) 97.4 °F (36.3 °C)   SpO2: 97%     General: Awake, Alert,  Oriented x 3, Mood and affect appropriate  Respiratory: Respirations even and unlabored  Cardiovascular: Peripheral pulses intact; no edema  Musculoskeletal Exam: Lower back pain    ASA Score: 3    Patient/Chart Verification  Patient ID Verified: Verbal  ID Band Applied: No  Consents Confirmed: Procedural, To be obtained in the Pre-Procedure area  Interval H&P(within 24 hr) Complete (required for Outpatients and Surgery Admit only): To be obtained in the Pre-Procedure area  Allergies Reviewed: Yes  Anticoag/NSAID held?: NA  Currently on antibiotics?: No    Assessment:   1. Lumbar radiculopathy        Plan: L5 LESI

## 2024-01-22 ENCOUNTER — TELEPHONE (OUTPATIENT)
Dept: PAIN MEDICINE | Facility: CLINIC | Age: 84
End: 2024-01-22

## 2024-01-22 NOTE — TELEPHONE ENCOUNTER
----- Message from Jay Meyer MD sent at 1/19/2024 11:22 AM EST -----  yes  ----- Message -----  From: Cate Pugh  Sent: 1/19/2024   9:26 AM EST  To: MD Dr. Betsy Salgado Joan would like to schedule her repeat L5 LESI for the end of April, is it ok to do so?    Milo

## 2024-01-22 NOTE — TELEPHONE ENCOUNTER
Patient scheduled for procedure with , 4/17/24.  Patient does not have mychart, so the following instructions were given to patient verbally, no vaccines 14 days prior or after procedure, nothing to eat or drink 1 hr prior to procedure, wear something loose and comfortable, no jewelry, if ill, infection or antibiotics, must call office to reschedule procedure.  Take prescription medications as normal and you will need a  18 yrs or older.  Patient denies taking any blood thinners, Aspirin or prescription.

## 2024-01-24 ENCOUNTER — TELEPHONE (OUTPATIENT)
Dept: RADIOLOGY | Facility: MEDICAL CENTER | Age: 84
End: 2024-01-24

## 2024-01-24 NOTE — TELEPHONE ENCOUNTER
Patient Reports     50    %     improvement post injection    Pain Level   more soreness rather than pain   /10

## 2024-04-17 ENCOUNTER — HOSPITAL ENCOUNTER (OUTPATIENT)
Dept: RADIOLOGY | Facility: CLINIC | Age: 84
Discharge: HOME | End: 2024-04-17
Payer: MEDICARE

## 2024-04-17 VITALS
SYSTOLIC BLOOD PRESSURE: 173 MMHG | TEMPERATURE: 97.9 F | DIASTOLIC BLOOD PRESSURE: 83 MMHG | RESPIRATION RATE: 20 BRPM | OXYGEN SATURATION: 98 % | HEART RATE: 64 BPM

## 2024-04-17 DIAGNOSIS — M54.16 LUMBAR RADICULOPATHY: ICD-10-CM

## 2024-04-17 PROCEDURE — A9585 GADOBUTROL INJECTION: HCPCS | Performed by: ANESTHESIOLOGY

## 2024-04-17 PROCEDURE — 62323 NJX INTERLAMINAR LMBR/SAC: CPT | Performed by: ANESTHESIOLOGY

## 2024-04-17 RX ORDER — METHYLPREDNISOLONE ACETATE 80 MG/ML
80 INJECTION, SUSPENSION INTRA-ARTICULAR; INTRALESIONAL; INTRAMUSCULAR; PARENTERAL; SOFT TISSUE ONCE
Status: COMPLETED | OUTPATIENT
Start: 2024-04-17 | End: 2024-04-17

## 2024-04-17 RX ORDER — GADOBUTROL 604.72 MG/ML
1 INJECTION INTRAVENOUS ONCE
Status: COMPLETED | OUTPATIENT
Start: 2024-04-17 | End: 2024-04-17

## 2024-04-17 RX ORDER — BUPIVACAINE HCL/PF 2.5 MG/ML
2 VIAL (ML) INJECTION ONCE
Status: COMPLETED | OUTPATIENT
Start: 2024-04-17 | End: 2024-04-17

## 2024-04-17 RX ADMIN — GADOBUTROL 1 ML: 604.72 INJECTION INTRAVENOUS at 11:04

## 2024-04-17 RX ADMIN — METHYLPREDNISOLONE ACETATE 80 MG: 80 INJECTION, SUSPENSION INTRA-ARTICULAR; INTRALESIONAL; INTRAMUSCULAR; PARENTERAL; SOFT TISSUE at 11:04

## 2024-04-17 RX ADMIN — BUPIVACAINE HYDROCHLORIDE 2 ML: 2.5 INJECTION, SOLUTION EPIDURAL; INFILTRATION; INTRACAUDAL at 11:04

## 2024-04-17 NOTE — DISCHARGE INSTR - LAB
Epidural Steroid Injection   WHAT YOU NEED TO KNOW:   An epidural steroid injection (TANJA) is a procedure to inject steroid medicine into the epidural space. The epidural space is between your spinal cord and vertebrae. Steroids reduce inflammation and fluid buildup in your spine that may be causing pain. You may be given pain medicine along with the steroids.          ACTIVITY  Do not drive or operate machinery today.  No strenuous activity today - bending, lifting, etc.  You may resume normal activites starting tomorrow - start slowly and as tolerated.  You may shower today, but no tub baths or hot tubs.  You may have numbness for several hours from the local anesthetic. Please use caution and common sense, especially with weight-bearing activities.    CARE OF THE INJECTION SITE  If you have soreness or pain, apply ice to the area today (20 minutes on/20 minutes off).  Starting tomorrow, you may use warm, moist heat or ice if needed.  You may have an increase or change in your discomfort for 36-48 hours after your treatment.  Apply ice and continue with any pain medication you have been prescribed.  Notify the [facility] if you have any of the following: redness, drainage, swelling, headache, stiff neck or fever above 100°F.    SPECIAL INSTRUCTIONS  Our office will contact you in approximately 7 days for a progress report.    MEDICATIONS  Continue to take all routine medications.  Our office may have instructed you to hold some medications.    As no general anesthesia was used in today's procedure, you should not experience any side effects related to anesthesia.     If you are diabetic, the steroids used in today's injection may temporarily increase your blood sugar levels after the first few days after your injection. Please keep a close eye on your sugars and alert the doctor who manages your diabetes if your sugars are significantly high from your baseline or you are symptomatic.     If you have a  problem specifically related to your procedure, please call our office at [phone].  Problems not related to your procedure should be directed to your primary care physician.

## 2024-04-17 NOTE — H&P
History of Present Illness: The patient is a 83 y.o. female who presents with complaints of lower back pain and is here today for a lumbar epidural steroid injection    Past Medical History:   Diagnosis Date    Coronary artery disease     Diabetes mellitus (HCC)     Hypertension     Macular degeneration disease     Spinal stenosis        Past Surgical History:   Procedure Laterality Date    CARDIAC OTHER      CARDIAC SURGERY           Current Outpatient Medications:     aspirin 81 mg chewable tablet, Chew 81 mg daily at bedtime, Disp: , Rfl:     Empagliflozin (Jardiance) 25 MG TABS, Take 25 mg by mouth every morning, Disp: , Rfl:     gabapentin (NEURONTIN) 300 mg capsule, 300 mg daily at bedtime, Disp: , Rfl:     glimepiride (AMARYL) 4 mg tablet, Take 4 mg by mouth in the morning, Disp: , Rfl:     lisinopril (ZESTRIL) 5 mg tablet, 5 mg daily at bedtime, Disp: , Rfl:     metoprolol succinate (TOPROL-XL) 25 mg 24 hr tablet, 25 mg daily at bedtime, Disp: , Rfl:     nitrofurantoin (MACRODANTIN) 50 mg capsule, Take 1 capsule by mouth daily (Patient not taking: Reported on 2/6/2023), Disp: , Rfl:     potassium chloride (Klor-Con) 10 mEq tablet, 10 mEq in the morning, Disp: , Rfl:     sitaGLIPtin (JANUVIA) 100 mg tablet, Take 100 mg by mouth daily with breakfast, Disp: , Rfl:     triamterene-hydrochlorothiazide (DYAZIDE) 37.5-25 mg per capsule, 1 capsule every morning, Disp: , Rfl:     Current Facility-Administered Medications:     bupivacaine (PF) (MARCAINE) 0.25 % injection 2 mL, 2 mL, Epidural, Once, Jay Meyer MD    Gadobutrol injection (SINGLE-DOSE) SOLN 1 mL, 1 mL, Other, Once, Jay Meyer MD    methylPREDNISolone acetate (DEPO-MEDROL) injection 80 mg, 80 mg, Epidural, Once, Jay Meyer MD    Allergies   Allergen Reactions    Iodinated Contrast Media Hives       Physical Exam:   Vitals:    04/17/24 1049   BP: 154/80   Pulse: 60   Resp: 20   Temp: 97.9 °F (36.6 °C)   SpO2: 98%     General: Awake, Alert,  Oriented x 3, Mood and affect appropriate  Respiratory: Respirations even and unlabored  Cardiovascular: Peripheral pulses intact; no edema  Musculoskeletal Exam: Lower back pain    ASA Score: 3    Patient/Chart Verification  Patient ID Verified: Verbal  Consents Confirmed: To be obtained in the Pre-Procedure area  Interval H&P(within 24 hr) Complete (required for Outpatients and Surgery Admit only): To be obtained in the Pre-Procedure area  Allergies Reviewed: Yes  Anticoag/NSAID held?: NA  Currently on antibiotics?: No    Assessment:   1. Lumbar radiculopathy        Plan: L5 LESI

## 2024-04-18 ENCOUNTER — TELEPHONE (OUTPATIENT)
Dept: PAIN MEDICINE | Facility: CLINIC | Age: 84
End: 2024-04-18

## 2024-04-19 NOTE — TELEPHONE ENCOUNTER
Patient scheduled for procedure with , 7/17/24.  Patient does not have mychart, so the following instructions were given to patient verbally, no vaccines 14 days prior or after procedure, nothing to eat or drink 1 hr prior to procedure, wear something loose and comfortable, no jewelry, if ill, infection or antibiotics, must call office to reschedule procedure.  Take prescription medications as normal and you will need a  18 yrs or older.  Patient denies taking any blood thinners, Aspirin or prescription.

## 2024-04-24 ENCOUNTER — TELEPHONE (OUTPATIENT)
Dept: RADIOLOGY | Facility: MEDICAL CENTER | Age: 84
End: 2024-04-24

## 2024-04-24 NOTE — TELEPHONE ENCOUNTER
Patient Reports     patient cannot determine at this time. She is better but under a lot of stress. Her  is in the hospital.

## 2024-07-17 ENCOUNTER — HOSPITAL ENCOUNTER (OUTPATIENT)
Dept: RADIOLOGY | Facility: CLINIC | Age: 84
Discharge: HOME | End: 2024-07-17
Admitting: ANESTHESIOLOGY
Payer: MEDICARE

## 2024-07-17 VITALS
SYSTOLIC BLOOD PRESSURE: 150 MMHG | RESPIRATION RATE: 17 BRPM | TEMPERATURE: 97.3 F | OXYGEN SATURATION: 97 % | HEART RATE: 71 BPM | DIASTOLIC BLOOD PRESSURE: 72 MMHG

## 2024-07-17 DIAGNOSIS — M54.16 LUMBAR RADICULOPATHY: ICD-10-CM

## 2024-07-17 PROCEDURE — 62323 NJX INTERLAMINAR LMBR/SAC: CPT | Performed by: ANESTHESIOLOGY

## 2024-07-17 PROCEDURE — A9585 GADOBUTROL INJECTION: HCPCS | Performed by: ANESTHESIOLOGY

## 2024-07-17 RX ORDER — GADOBUTROL 604.72 MG/ML
1 INJECTION INTRAVENOUS ONCE
Status: COMPLETED | OUTPATIENT
Start: 2024-07-17 | End: 2024-07-17

## 2024-07-17 RX ORDER — BUPIVACAINE HCL/PF 2.5 MG/ML
2 VIAL (ML) INJECTION ONCE
Status: COMPLETED | OUTPATIENT
Start: 2024-07-17 | End: 2024-07-17

## 2024-07-17 RX ORDER — METHYLPREDNISOLONE ACETATE 80 MG/ML
80 INJECTION, SUSPENSION INTRA-ARTICULAR; INTRALESIONAL; INTRAMUSCULAR; PARENTERAL; SOFT TISSUE ONCE
Status: COMPLETED | OUTPATIENT
Start: 2024-07-17 | End: 2024-07-17

## 2024-07-17 RX ADMIN — GADOBUTROL 1 ML: 604.72 INJECTION INTRAVENOUS at 11:03

## 2024-07-17 RX ADMIN — BUPIVACAINE HYDROCHLORIDE 2 ML: 2.5 INJECTION, SOLUTION EPIDURAL; INFILTRATION; INTRACAUDAL at 11:03

## 2024-07-17 RX ADMIN — METHYLPREDNISOLONE ACETATE 80 MG: 80 INJECTION, SUSPENSION INTRA-ARTICULAR; INTRALESIONAL; INTRAMUSCULAR; PARENTERAL; SOFT TISSUE at 11:03

## 2024-07-17 NOTE — DISCHARGE INSTR - LAB
Epidural Steroid Injection   WHAT YOU NEED TO KNOW:   An epidural steroid injection (TANJA) is a procedure to inject steroid medicine into the epidural space. The epidural space is between your spinal cord and vertebrae. Steroids reduce inflammation and fluid buildup in your spine that may be causing pain. You may be given pain medicine along with the steroids.          ACTIVITY  Do not drive or operate machinery today.  No strenuous activity today - bending, lifting, etc.  You may resume normal activites starting tomorrow - start slowly and as tolerated.  You may shower today, but no tub baths or hot tubs.  You may have numbness for several hours from the local anesthetic. Please use caution and common sense, especially with weight-bearing activities.    CARE OF THE INJECTION SITE  If you have soreness or pain, apply ice to the area today (20 minutes on/20 minutes off).  Starting tomorrow, you may use warm, moist heat or ice if needed.  You may have an increase or change in your discomfort for 36-48 hours after your treatment.  Apply ice and continue with any pain medication you have been prescribed.  Notify the [facility] if you have any of the following: redness, drainage, swelling, headache, stiff neck or fever above 100°F.    SPECIAL INSTRUCTIONS  Our office will contact you in approximately 14 days for a progress report.    MEDICATIONS  Continue to take all routine medications.  Our office may have instructed you to hold some medications.    As no general anesthesia was used in today's procedure, you should not experience any side effects related to anesthesia.     If you are diabetic, the steroids used in today's injection may temporarily increase your blood sugar levels after the first few days after your injection. Please keep a close eye on your sugars and alert the doctor who manages your diabetes if your sugars are significantly high from your baseline or you are symptomatic.     If you have a  problem specifically related to your procedure, please call our office at [phone].  Problems not related to your procedure should be directed to your primary care physician.

## 2024-07-17 NOTE — H&P
History of Present Illness: The patient is a 83 y.o. female who presents with complaints of low back pain is here today for a lumbar epidural steroid injection    Past Medical History:   Diagnosis Date    Coronary artery disease     Diabetes mellitus (HCC)     Hypertension     Macular degeneration disease     Spinal stenosis        Past Surgical History:   Procedure Laterality Date    CARDIAC OTHER      CARDIAC SURGERY           Current Outpatient Medications:     aspirin 81 mg chewable tablet, Chew 81 mg daily at bedtime, Disp: , Rfl:     Empagliflozin (Jardiance) 25 MG TABS, Take 25 mg by mouth every morning, Disp: , Rfl:     gabapentin (NEURONTIN) 300 mg capsule, 300 mg daily at bedtime, Disp: , Rfl:     glimepiride (AMARYL) 4 mg tablet, Take 4 mg by mouth in the morning, Disp: , Rfl:     lisinopril (ZESTRIL) 5 mg tablet, 5 mg daily at bedtime, Disp: , Rfl:     metoprolol succinate (TOPROL-XL) 25 mg 24 hr tablet, 25 mg daily at bedtime, Disp: , Rfl:     nitrofurantoin (MACRODANTIN) 50 mg capsule, Take 1 capsule by mouth daily (Patient not taking: Reported on 2/6/2023), Disp: , Rfl:     potassium chloride (Klor-Con) 10 mEq tablet, 10 mEq in the morning, Disp: , Rfl:     sitaGLIPtin (JANUVIA) 100 mg tablet, Take 100 mg by mouth daily with breakfast, Disp: , Rfl:     triamterene-hydrochlorothiazide (DYAZIDE) 37.5-25 mg per capsule, 1 capsule every morning, Disp: , Rfl:     Current Facility-Administered Medications:     bupivacaine (PF) (MARCAINE) 0.25 % injection 2 mL, 2 mL, Epidural, Once, Jay Meyer MD    Gadobutrol injection (SINGLE-DOSE) SOLN 1 mL, 1 mL, Other, Once, Jay Meyer MD    methylPREDNISolone acetate (DEPO-MEDROL) injection 80 mg, 80 mg, Epidural, Once, Jay Meyer MD    Allergies   Allergen Reactions    Iodinated Contrast Media Hives       Physical Exam:   Vitals:    07/17/24 1047   BP: 153/84   Pulse: 68   Resp: 17   Temp: (!) 97.3 °F (36.3 °C)   SpO2: 98%     General: Awake, Alert,  Oriented x 3, Mood and affect appropriate  Respiratory: Respirations even and unlabored  Cardiovascular: Peripheral pulses intact; no edema  Musculoskeletal Exam: Lower back pain    ASA Score: 3    Patient/Chart Verification  Patient ID Verified: Verbal  ID Band Applied: No  Consents Confirmed: Procedural  H&P( within 30 days) Verified: To be obtained in the Pre-Procedure area  Allergies Reviewed: Yes  Anticoag/NSAID held?: NA  Currently on antibiotics?: No    Assessment:   1. Lumbar radiculopathy        Plan: L5 LESI

## 2024-07-18 ENCOUNTER — TELEPHONE (OUTPATIENT)
Age: 84
End: 2024-07-18

## 2024-07-18 NOTE — TELEPHONE ENCOUNTER
Pt had her LESI @ L5 done yest (7/17) by FQ, she asked if if she could call today and set up her next one to be done in 3 months. FQ told pt yes.  Pls call pt to schedule repeat inj to be done in 3 months.

## 2024-07-18 NOTE — TELEPHONE ENCOUNTER
Scheduled patient for LESI  Patient denies RX blood thinners/ NSAIDS  Nothing to eat or drink 1 hour prior to procedure  Needs to arrange transportation  Proper clothing for procedure  No vaccines 2 weeks prior or after procedure  If ill or place on antibiotics, please call to reschedule

## 2024-07-18 NOTE — TELEPHONE ENCOUNTER
Caller: patient    Doctor: BRITNEY    Reason for call: patient would like to schedule a procedure for her 3 month    Call back#:

## 2024-07-31 ENCOUNTER — TELEPHONE (OUTPATIENT)
Dept: RADIOLOGY | Facility: MEDICAL CENTER | Age: 84
End: 2024-07-31

## 2024-10-23 ENCOUNTER — HOSPITAL ENCOUNTER (OUTPATIENT)
Dept: RADIOLOGY | Facility: CLINIC | Age: 84
Discharge: HOME | End: 2024-10-23
Payer: MEDICARE

## 2024-10-23 VITALS
RESPIRATION RATE: 20 BRPM | DIASTOLIC BLOOD PRESSURE: 78 MMHG | TEMPERATURE: 98.2 F | HEART RATE: 61 BPM | SYSTOLIC BLOOD PRESSURE: 171 MMHG | OXYGEN SATURATION: 97 %

## 2024-10-23 DIAGNOSIS — M54.16 LUMBAR RADICULOPATHY: ICD-10-CM

## 2024-10-23 PROCEDURE — A9585 GADOBUTROL INJECTION: HCPCS | Performed by: ANESTHESIOLOGY

## 2024-10-23 PROCEDURE — 62323 NJX INTERLAMINAR LMBR/SAC: CPT | Performed by: ANESTHESIOLOGY

## 2024-10-23 RX ORDER — GADOBUTROL 604.72 MG/ML
1 INJECTION INTRAVENOUS ONCE
Status: COMPLETED | OUTPATIENT
Start: 2024-10-23 | End: 2024-10-23

## 2024-10-23 RX ORDER — METHYLPREDNISOLONE ACETATE 80 MG/ML
80 INJECTION, SUSPENSION INTRA-ARTICULAR; INTRALESIONAL; INTRAMUSCULAR; PARENTERAL; SOFT TISSUE ONCE
Status: COMPLETED | OUTPATIENT
Start: 2024-10-23 | End: 2024-10-23

## 2024-10-23 RX ORDER — BUPIVACAINE HCL/PF 2.5 MG/ML
2 VIAL (ML) INJECTION ONCE
Status: COMPLETED | OUTPATIENT
Start: 2024-10-23 | End: 2024-10-23

## 2024-10-23 RX ADMIN — METHYLPREDNISOLONE ACETATE 80 MG: 80 INJECTION, SUSPENSION INTRA-ARTICULAR; INTRALESIONAL; INTRAMUSCULAR; SOFT TISSUE at 11:36

## 2024-10-23 RX ADMIN — BUPIVACAINE HYDROCHLORIDE 2 ML: 2.5 INJECTION, SOLUTION EPIDURAL; INFILTRATION; INTRACAUDAL at 11:36

## 2024-10-23 RX ADMIN — GADOBUTROL 1 ML: 604.72 INJECTION INTRAVENOUS at 11:35

## 2024-10-23 NOTE — H&P
History of Present Illness: The patient is a 84 y.o. female who presents with complaints of lower back pain is here today for a lumbar epidural steroid injection    Past Medical History:   Diagnosis Date    Coronary artery disease     Diabetes mellitus (HCC)     Hypertension     Macular degeneration disease     Spinal stenosis        Past Surgical History:   Procedure Laterality Date    CARDIAC OTHER      CARDIAC SURGERY           Current Outpatient Medications:     aspirin 81 mg chewable tablet, Chew 81 mg daily at bedtime, Disp: , Rfl:     Empagliflozin (Jardiance) 25 MG TABS, Take 25 mg by mouth every morning, Disp: , Rfl:     gabapentin (NEURONTIN) 300 mg capsule, 300 mg daily at bedtime, Disp: , Rfl:     glimepiride (AMARYL) 4 mg tablet, Take 4 mg by mouth in the morning, Disp: , Rfl:     lisinopril (ZESTRIL) 5 mg tablet, 5 mg daily at bedtime, Disp: , Rfl:     metoprolol succinate (TOPROL-XL) 25 mg 24 hr tablet, 25 mg daily at bedtime, Disp: , Rfl:     nitrofurantoin (MACRODANTIN) 50 mg capsule, Take 1 capsule by mouth daily (Patient not taking: Reported on 2/6/2023), Disp: , Rfl:     potassium chloride (Klor-Con) 10 mEq tablet, 10 mEq in the morning, Disp: , Rfl:     sitaGLIPtin (JANUVIA) 100 mg tablet, Take 100 mg by mouth daily with breakfast, Disp: , Rfl:     triamterene-hydrochlorothiazide (DYAZIDE) 37.5-25 mg per capsule, 1 capsule every morning, Disp: , Rfl:     Current Facility-Administered Medications:     bupivacaine (PF) (MARCAINE) 0.25 % injection 2 mL, 2 mL, Epidural, Once, Jay Meyer MD    Gadobutrol injection (SINGLE-DOSE) SOLN 1 mL, 1 mL, Other, Once, Jay Meyer MD    methylPREDNISolone acetate (DEPO-MEDROL) injection 80 mg, 80 mg, Epidural, Once, Jay Meyer MD    Allergies   Allergen Reactions    Iodinated Contrast Media Hives       Physical Exam:   Vitals:    10/23/24 1121   BP: 168/68   Pulse: 64   Resp: 20   Temp: 98.2 °F (36.8 °C)   SpO2: 96%     General: Awake, Alert,  Oriented x 3, Mood and affect appropriate  Respiratory: Respirations even and unlabored  Cardiovascular: Peripheral pulses intact; no edema  Musculoskeletal Exam: Lower back pain    ASA Score: 3    Patient/Chart Verification  Patient ID Verified: Verbal  Consents Confirmed: To be obtained in the Pre-Procedure area  Interval H&P(within 24 hr) Complete (required for Outpatients and Surgery Admit only): To be obtained in the Procedural area  Allergies Reviewed: Yes  Anticoag/NSAID held?: NA  Currently on antibiotics?: No    Assessment:   1. Lumbar radiculopathy        Plan: L5 LESI

## 2024-10-23 NOTE — DISCHARGE INSTR - LAB
Epidural Steroid Injection   WHAT YOU NEED TO KNOW:   An epidural steroid injection (TANJA) is a procedure to inject steroid medicine into the epidural space. The epidural space is between your spinal cord and vertebrae. Steroids reduce inflammation and fluid buildup in your spine that may be causing pain. You may be given pain medicine along with the steroids.          ACTIVITY  Do not drive or operate machinery today.  No strenuous activity today - bending, lifting, etc.  You may resume normal activites starting tomorrow - start slowly and as tolerated.  You may shower today, but no tub baths or hot tubs.  You may have numbness for several hours from the local anesthetic. Please use caution and common sense, especially with weight-bearing activities.    CARE OF THE INJECTION SITE  If you have soreness or pain, apply ice to the area today (20 minutes on/20 minutes off).  Starting tomorrow, you may use warm, moist heat or ice if needed.  You may have an increase or change in your discomfort for 36-48 hours after your treatment.  Apply ice and continue with any pain medication you have been prescribed.  Notify the [facility] if you have any of the following: redness, drainage, swelling, headache, stiff neck or fever above 100°F.    SPECIAL INSTRUCTIONS  Our office will contact you in approximately 14 days for a progress report.    MEDICATIONS  Continue to take all routine medications.  Our office may have instructed you to hold some medications.    As no general anesthesia was used in today's procedure, you should not experience any side effects related to anesthesia.     If you are diabetic, the steroids used in today's injection may temporarily increase your blood sugar levels after the first few days after your injection. Please keep a close eye on your sugars and alert the doctor who manages your diabetes if your sugars are significantly high from your baseline or you are symptomatic.     If you have a  problem specifically related to your procedure, please call our office at [phone].  Problems not related to your procedure should be directed to your primary care physician.

## 2024-10-24 ENCOUNTER — TELEPHONE (OUTPATIENT)
Dept: PAIN MEDICINE | Facility: CLINIC | Age: 84
End: 2024-10-24

## 2024-10-24 NOTE — TELEPHONE ENCOUNTER
Please call patient to schedule office visit with CRNP or FQ.  She would like to repeat her L5 LESI, but she has not had an updated eval since 2/23.

## 2024-11-06 ENCOUNTER — TELEPHONE (OUTPATIENT)
Dept: RADIOLOGY | Facility: MEDICAL CENTER | Age: 84
End: 2024-11-06

## 2024-11-06 NOTE — TELEPHONE ENCOUNTER
Patient Reports    at least 50% she could not give any more information     %     improvement post injection

## 2024-11-26 ENCOUNTER — OFFICE VISIT (OUTPATIENT)
Dept: PAIN MEDICINE | Facility: CLINIC | Age: 84
End: 2024-11-26
Payer: MEDICARE

## 2024-11-26 VITALS
HEART RATE: 65 BPM | SYSTOLIC BLOOD PRESSURE: 120 MMHG | HEIGHT: 61 IN | DIASTOLIC BLOOD PRESSURE: 70 MMHG | OXYGEN SATURATION: 97 % | BODY MASS INDEX: 22.47 KG/M2 | RESPIRATION RATE: 20 BRPM | WEIGHT: 119 LBS

## 2024-11-26 DIAGNOSIS — G89.4 CHRONIC PAIN SYNDROME: ICD-10-CM

## 2024-11-26 DIAGNOSIS — M51.16 INTERVERTEBRAL DISC DISORDER WITH RADICULOPATHY OF LUMBAR REGION: Primary | ICD-10-CM

## 2024-11-26 DIAGNOSIS — M48.061 DEGENERATIVE LUMBAR SPINAL STENOSIS: ICD-10-CM

## 2024-11-26 PROCEDURE — 99214 OFFICE O/P EST MOD 30 MIN: CPT

## 2024-11-26 NOTE — PROGRESS NOTES
Assessment:  1. Intervertebral disc disorder with radiculopathy of lumbar region    2. Chronic pain syndrome    3. Degenerative lumbar spinal stenosis        Plan:  The patient is a 4-year-old female with a history of chronic pain secondary to low back pain, lumbar intervertebral disc disorder with radiculopathy and degenerative lumbar spinal stenosis who presents to the office with improved but ongoing bilateral low back pain following an L5 LESI done on 10/23/2024.    At this time, patient would like to schedule her next lumbar epidural steroid injection for 3 months from now, as she does get this injection every 3 to 4 months which is very helpful in managing her bilateral low back pain.  I instructed our  will schedule her.  Complete risks and benefits including bleeding, infection, tissue reaction, nerve injury and allergic reaction were discussed.  The approach was demonstrated using models and literature was provided.  Verbal and written consent was obtained.    My impressions and treatment recommendations were discussed in detail with the patient who verbalized understanding and had no further questions.  Discharge instructions were provided. I personally saw and examined the patient and I agree with the above discussed plan of care.    Orders Placed This Encounter   Procedures    [place] spine and pain procedure     Dr Meyer - please schedule for January     Standing Status:   Future     Expected Date:   11/26/2024     Expiration Date:   11/26/2028     Reason for Exam::   L5 LESI     Anticoagulant hold needed?:   No     No orders of the defined types were placed in this encounter.      History of Present Illness:  Blanca Read is a 84 y.o. female with a history of chronic pain secondary to low back pain, lumbar intervertebral disc disorder with radiculopathy and degenerative lumbar spinal stenosis.  She was last seen on 10/23/2024 where she underwent an L5 LESI which continues to provide her greater  than 50% relief of her pain.  She presents to the office with improved but ongoing bilateral low back pain.  She states she gets the lumbar epidural steroid injection about every 3 to 4 months and is very helpful.    She states her pain is better since the last office visit and constant.  She is currently rating her pain a 2/10 on a numeric scale.    I have personally reviewed and/or updated the patient's past medical history, past surgical history, family history, social history, current medications, allergies, and vital signs today.     Review of Systems   Respiratory:  Negative for shortness of breath.    Cardiovascular:  Negative for chest pain.   Gastrointestinal:  Negative for constipation, diarrhea, nausea and vomiting.   Musculoskeletal:  Positive for back pain and gait problem. Negative for arthralgias, joint swelling and myalgias.   Skin:  Negative for rash.   Neurological:  Negative for dizziness, seizures and weakness.   All other systems reviewed and are negative.      Patient Active Problem List   Diagnosis    Type 2 diabetes mellitus with renal complication (HCC)    Primary hypertension    Lumbar radiculopathy       Past Medical History:   Diagnosis Date    Coronary artery disease     Diabetes mellitus (HCC)     Hypertension     Macular degeneration disease     Spinal stenosis        Past Surgical History:   Procedure Laterality Date    CARDIAC OTHER      CARDIAC SURGERY         History reviewed. No pertinent family history.    Social History     Occupational History    Not on file   Tobacco Use    Smoking status: Never    Smokeless tobacco: Never   Vaping Use    Vaping status: Never Used   Substance and Sexual Activity    Alcohol use: Never    Drug use: Never    Sexual activity: Not on file       Current Outpatient Medications on File Prior to Visit   Medication Sig    aspirin 81 mg chewable tablet Chew 81 mg daily at bedtime    Empagliflozin (Jardiance) 25 MG TABS Take 25 mg by mouth every morning  "   gabapentin (NEURONTIN) 300 mg capsule 300 mg daily at bedtime    glimepiride (AMARYL) 4 mg tablet Take 4 mg by mouth in the morning    lisinopril (ZESTRIL) 5 mg tablet 5 mg daily at bedtime    metoprolol succinate (TOPROL-XL) 25 mg 24 hr tablet 25 mg daily at bedtime    potassium chloride (Klor-Con) 10 mEq tablet 10 mEq in the morning    sitaGLIPtin (JANUVIA) 100 mg tablet Take 100 mg by mouth daily with breakfast    triamterene-hydrochlorothiazide (DYAZIDE) 37.5-25 mg per capsule 1 capsule every morning    nitrofurantoin (MACRODANTIN) 50 mg capsule Take 1 capsule by mouth daily (Patient not taking: Reported on 2/6/2023)     No current facility-administered medications on file prior to visit.       Allergies   Allergen Reactions    Iodinated Contrast Media Hives       Physical Exam:    /70   Pulse 65   Resp 20   Ht 5' 1\" (1.549 m)   Wt 54 kg (119 lb)   SpO2 97%   BMI 22.48 kg/m²     Constitutional:normal, well developed, well nourished, alert, in no distress and non-toxic and no overt pain behavior.  Eyes:anicteric  HEENT:grossly intact  Neck:supple, symmetric, trachea midline and no masses   Pulmonary:even and unlabored  Cardiovascular:No edema or pitting edema present  Skin:Normal without rashes or lesions and well hydrated  Psychiatric:Mood and affect appropriate  Neurologic:Cranial Nerves II-XII grossly intact  Musculoskeletal:normal    Lumbar Spine Exam    Appearance:  Normal lordosis  Palpation/Tenderness:  left lumbar paraspinal tenderness  right lumbar paraspinal tenderness  Sensory:  no sensory deficits noted  Range of Motion:  Flexion:  Minimally limited  with pain  Extension:  Minimally limited  with pain  Motor Strength:  Left hip flexion:  5/5  Right hip flexion:  5/5  Left knee flexion:  5/5  Left knee extension:  5/5  Right knee flexion:  5/5  Right knee extension:  5/5  Left foot dorsiflexion:  5/5  Left foot plantar flexion:  5/5  Right foot dorsiflexion:  5/5  Right foot plantar " flexion:  5/5      Imaging

## 2025-01-29 ENCOUNTER — HOSPITAL ENCOUNTER (OUTPATIENT)
Dept: RADIOLOGY | Facility: CLINIC | Age: 85
Discharge: HOME | End: 2025-01-29
Payer: MEDICARE

## 2025-01-29 VITALS
HEART RATE: 67 BPM | DIASTOLIC BLOOD PRESSURE: 70 MMHG | TEMPERATURE: 98 F | OXYGEN SATURATION: 96 % | RESPIRATION RATE: 20 BRPM | SYSTOLIC BLOOD PRESSURE: 154 MMHG

## 2025-01-29 DIAGNOSIS — M51.16 INTERVERTEBRAL DISC DISORDER WITH RADICULOPATHY OF LUMBAR REGION: ICD-10-CM

## 2025-01-29 PROCEDURE — 62323 NJX INTERLAMINAR LMBR/SAC: CPT | Performed by: ANESTHESIOLOGY

## 2025-01-29 PROCEDURE — A9585 GADOBUTROL INJECTION: HCPCS | Performed by: ANESTHESIOLOGY

## 2025-01-29 RX ORDER — METHYLPREDNISOLONE ACETATE 80 MG/ML
80 INJECTION, SUSPENSION INTRA-ARTICULAR; INTRALESIONAL; INTRAMUSCULAR; PARENTERAL; SOFT TISSUE ONCE
Status: COMPLETED | OUTPATIENT
Start: 2025-01-29 | End: 2025-01-29

## 2025-01-29 RX ORDER — GADOBUTROL 604.72 MG/ML
1 INJECTION INTRAVENOUS ONCE
Status: COMPLETED | OUTPATIENT
Start: 2025-01-29 | End: 2025-01-29

## 2025-01-29 RX ORDER — BUPIVACAINE HCL/PF 2.5 MG/ML
2 VIAL (ML) INJECTION ONCE
Status: COMPLETED | OUTPATIENT
Start: 2025-01-29 | End: 2025-01-29

## 2025-01-29 RX ADMIN — BUPIVACAINE HYDROCHLORIDE 2 ML: 2.5 INJECTION, SOLUTION EPIDURAL; INFILTRATION; INTRACAUDAL at 11:00

## 2025-01-29 RX ADMIN — METHYLPREDNISOLONE ACETATE 80 MG: 80 INJECTION, SUSPENSION INTRA-ARTICULAR; INTRALESIONAL; INTRAMUSCULAR; SOFT TISSUE at 11:00

## 2025-01-29 RX ADMIN — GADOBUTROL 1 ML: 604.72 INJECTION INTRAVENOUS at 11:00

## 2025-01-29 NOTE — DISCHARGE INSTR - LAB
Epidural Steroid Injection   WHAT YOU NEED TO KNOW:   An epidural steroid injection (TANJA) is a procedure to inject steroid medicine into the epidural space. The epidural space is between your spinal cord and vertebrae. Steroids reduce inflammation and fluid buildup in your spine that may be causing pain. You may be given pain medicine along with the steroids.          ACTIVITY  Do not drive or operate machinery today.  No strenuous activity today - bending, lifting, etc.  You may resume normal activites starting tomorrow - start slowly and as tolerated.  You may shower today, but no tub baths or hot tubs.  You may have numbness for several hours from the local anesthetic. Please use caution and common sense, especially with weight-bearing activities.    CARE OF THE INJECTION SITE  If you have soreness or pain, apply ice to the area today (20 minutes on/20 minutes off).  Starting tomorrow, you may use warm, moist heat or ice if needed.  You may have an increase or change in your discomfort for 36-48 hours after your treatment.  Apply ice and continue with any pain medication you have been prescribed.  Notify [facility] if you have any of the following: redness, drainage, swelling, headache, stiff neck or fever above 100°F.    SPECIAL INSTRUCTIONS  Our office will contact you in approximately 14 days for a progress report.    MEDICATIONS  Continue to take all routine medications.  Our office may have instructed you to hold some medications.    As no general anesthesia was used in today's procedure, you should not experience any side effects related to anesthesia.     If you are diabetic, the steroids used in today's injection may temporarily increase your blood sugar levels after the first few days after your injection. Please keep a close eye on your sugars and alert the doctor who manages your diabetes if your sugars are significantly high from your baseline or you are symptomatic.     If you have a  problem specifically related to your procedure, please call our office at [phone].  Problems not related to your procedure should be directed to your primary care physician.

## 2025-01-29 NOTE — H&P
History of Present Illness: The patient is a 84 y.o. female who presents with complaints of lower back pain is here today for a lumbar epidural steroid injection    Past Medical History:   Diagnosis Date    Coronary artery disease     Diabetes mellitus (HCC)     Hypertension     Macular degeneration disease     Spinal stenosis        Past Surgical History:   Procedure Laterality Date    CARDIAC OTHER      CARDIAC SURGERY           Current Outpatient Medications:     aspirin 81 mg chewable tablet, Chew 81 mg daily at bedtime, Disp: , Rfl:     Empagliflozin (Jardiance) 25 MG TABS, Take 25 mg by mouth every morning, Disp: , Rfl:     gabapentin (NEURONTIN) 300 mg capsule, 300 mg daily at bedtime, Disp: , Rfl:     glimepiride (AMARYL) 4 mg tablet, Take 4 mg by mouth in the morning, Disp: , Rfl:     lisinopril (ZESTRIL) 5 mg tablet, 5 mg daily at bedtime, Disp: , Rfl:     metoprolol succinate (TOPROL-XL) 25 mg 24 hr tablet, 25 mg daily at bedtime, Disp: , Rfl:     nitrofurantoin (MACRODANTIN) 50 mg capsule, Take 1 capsule by mouth daily (Patient not taking: Reported on 2/6/2023), Disp: , Rfl:     potassium chloride (Klor-Con) 10 mEq tablet, 10 mEq in the morning, Disp: , Rfl:     sitaGLIPtin (JANUVIA) 100 mg tablet, Take 100 mg by mouth daily with breakfast, Disp: , Rfl:     triamterene-hydrochlorothiazide (DYAZIDE) 37.5-25 mg per capsule, 1 capsule every morning, Disp: , Rfl:     Current Facility-Administered Medications:     bupivacaine (PF) (MARCAINE) 0.25 % injection 2 mL, 2 mL, Epidural, Once, Jay Meyer MD    Gadobutrol injection (SINGLE-DOSE) SOLN 1 mL, 1 mL, Other, Once, Jay Meyer MD    methylPREDNISolone acetate (DEPO-MEDROL) injection 80 mg, 80 mg, Epidural, Once, Jay Meyer MD    Allergies   Allergen Reactions    Iodinated Contrast Media Hives       Physical Exam:   Vitals:    01/29/25 1037   BP: 161/69   Pulse: 70   Resp: 18   Temp: 98 °F (36.7 °C)   SpO2: 97%     General: Awake, Alert,  Oriented x 3, Mood and affect appropriate  Respiratory: Respirations even and unlabored  Cardiovascular: Peripheral pulses intact; no edema  Musculoskeletal Exam: Lower back pain    ASA Score: 3    Patient/Chart Verification  Patient ID Verified: Verbal  ID Band Applied: No  Consents Confirmed: To be obtained in the Procedural area  Interval H&P(within 24 hr) Complete (required for Outpatients and Surgery Admit only): To be obtained in the Procedural area  Allergies Reviewed: Yes  Anticoag/NSAID held?: NA  Currently on antibiotics?: No    Assessment:   1. Intervertebral disc disorder with radiculopathy of lumbar region        Plan: L5 LESI

## 2025-02-12 ENCOUNTER — TELEPHONE (OUTPATIENT)
Dept: RADIOLOGY | Facility: MEDICAL CENTER | Age: 85
End: 2025-02-12

## 2025-05-21 ENCOUNTER — HOSPITAL ENCOUNTER (OUTPATIENT)
Dept: RADIOLOGY | Facility: CLINIC | Age: 85
Discharge: HOME | End: 2025-05-21
Attending: ANESTHESIOLOGY

## 2025-05-21 VITALS
OXYGEN SATURATION: 98 % | SYSTOLIC BLOOD PRESSURE: 146 MMHG | DIASTOLIC BLOOD PRESSURE: 67 MMHG | HEART RATE: 68 BPM | RESPIRATION RATE: 18 BRPM | TEMPERATURE: 97.4 F

## 2025-05-21 DIAGNOSIS — M54.16 LUMBAR RADICULOPATHY: ICD-10-CM

## 2025-05-21 RX ORDER — BUPIVACAINE HCL/PF 2.5 MG/ML
2 VIAL (ML) INJECTION ONCE
Status: DISCONTINUED | OUTPATIENT
Start: 2025-05-21 | End: 2025-05-25 | Stop reason: HOSPADM

## 2025-05-21 RX ORDER — METHYLPREDNISOLONE ACETATE 80 MG/ML
80 INJECTION, SUSPENSION INTRA-ARTICULAR; INTRALESIONAL; INTRAMUSCULAR; PARENTERAL; SOFT TISSUE ONCE
Status: DISCONTINUED | OUTPATIENT
Start: 2025-05-21 | End: 2025-05-25 | Stop reason: HOSPADM

## 2025-05-21 NOTE — DISCHARGE INSTR - LAB
Epidural Steroid Injection   WHAT YOU NEED TO KNOW:   An epidural steroid injection (TANJA) is a procedure to inject steroid medicine into the epidural space. The epidural space is between your spinal cord and vertebrae. Steroids reduce inflammation and fluid buildup in your spine that may be causing pain. You may be given pain medicine along with the steroids.          ACTIVITY  Do not drive or operate machinery today.  No strenuous activity today - bending, lifting, etc.  You may resume normal activites starting tomorrow - start slowly and as tolerated.  You may shower today, but no tub baths or hot tubs.  You may have numbness for several hours from the local anesthetic. Please use caution and common sense, especially with weight-bearing activities.    CARE OF THE INJECTION SITE  If you have soreness or pain, apply ice to the area today (20 minutes on/20 minutes off).  Starting tomorrow, you may use warm, moist heat or ice if needed.  You may have an increase or change in your discomfort for 36-48 hours after your treatment.  Apply ice and continue with any pain medication you have been prescribed.  Notify the [facility] if you have any of the following: redness, drainage, swelling, headache, stiff neck or fever above 100°F.    SPECIAL INSTRUCTIONS  Our office will contact you in approximately 14 days for a progress report.    MEDICATIONS  Continue to take all routine medications.  Our office may have instructed you to hold some medications.    As no general anesthesia was used in today's procedure, you should not experience any side effects related to anesthesia.     If you are diabetic, the steroids used in today's injection may temporarily increase your blood sugar levels after the first few days after your injection. Please keep a close eye on your sugars and alert the doctor who manages your diabetes if your sugars are significantly high from your baseline or you are symptomatic.     If you have a  problem specifically related to your procedure, please call our office at [phone].  Problems not related to your procedure should be directed to your primary care physician.

## 2025-05-27 ENCOUNTER — TELEPHONE (OUTPATIENT)
Age: 85
End: 2025-05-27

## 2025-05-27 NOTE — TELEPHONE ENCOUNTER
Caller: Patient    Doctor: Dr. TAN    Reason for call: Returning missed call  Transferred to    Call back#:

## 2025-05-27 NOTE — TELEPHONE ENCOUNTER
Caller: Patient    Doctor: Dr. TAN    Reason for call: Patient would like to scheduled her procedure    Call back#:

## 2025-05-28 ENCOUNTER — HOSPITAL ENCOUNTER (OUTPATIENT)
Dept: RADIOLOGY | Facility: CLINIC | Age: 85
Discharge: HOME | End: 2025-05-28
Attending: ANESTHESIOLOGY

## 2025-05-28 ENCOUNTER — TELEPHONE (OUTPATIENT)
Dept: RADIOLOGY | Facility: CLINIC | Age: 85
End: 2025-05-28

## 2025-05-28 VITALS
TEMPERATURE: 98 F | RESPIRATION RATE: 20 BRPM | OXYGEN SATURATION: 98 % | SYSTOLIC BLOOD PRESSURE: 133 MMHG | DIASTOLIC BLOOD PRESSURE: 70 MMHG | HEART RATE: 68 BPM

## 2025-05-28 DIAGNOSIS — M54.16 LUMBAR RADICULOPATHY: ICD-10-CM

## 2025-05-28 RX ORDER — METHYLPREDNISOLONE ACETATE 80 MG/ML
80 INJECTION, SUSPENSION INTRA-ARTICULAR; INTRALESIONAL; INTRAMUSCULAR; PARENTERAL; SOFT TISSUE ONCE
Status: DISCONTINUED | OUTPATIENT
Start: 2025-05-28 | End: 2025-06-01 | Stop reason: HOSPADM

## 2025-05-28 RX ORDER — GADOBUTROL 604.72 MG/ML
1 INJECTION INTRAVENOUS ONCE
Status: DISCONTINUED | OUTPATIENT
Start: 2025-05-28 | End: 2025-06-01 | Stop reason: HOSPADM

## 2025-05-28 RX ORDER — BUPIVACAINE HCL/PF 2.5 MG/ML
2 VIAL (ML) INJECTION ONCE
Status: DISCONTINUED | OUTPATIENT
Start: 2025-05-28 | End: 2025-06-01 | Stop reason: HOSPADM

## 2025-05-28 NOTE — H&P
History of Present Illness: The patient is a 84 y.o. female who presents with complaints of lower back pain and is here today for a lumbar epidural steroid injection    Past Medical History[1]    Past Surgical History[2]    Current Medications[3]    Allergies[4]    Physical Exam:   Vitals:    05/28/25 1402   BP: 133/70   Pulse: 68   Resp: 20   Temp: 98 °F (36.7 °C)   SpO2: 98%     General: Awake, Alert, Oriented x 3, Mood and affect appropriate  Respiratory: Respirations even and unlabored  Cardiovascular: Peripheral pulses intact; no edema  Musculoskeletal Exam: Lower back pain    ASA Score: 3         Assessment:   1. Lumbar radiculopathy        Plan: L5 LESI         [1]   Past Medical History:  Diagnosis Date    Coronary artery disease     Diabetes mellitus (HCC)     Hypertension     Macular degeneration disease     Spinal stenosis    [2]   Past Surgical History:  Procedure Laterality Date    CARDIAC OTHER      CARDIAC SURGERY     [3]   Current Outpatient Medications:     aspirin 81 mg chewable tablet, Chew 81 mg daily at bedtime, Disp: , Rfl:     Empagliflozin (Jardiance) 25 MG TABS, Take 25 mg by mouth every morning, Disp: , Rfl:     gabapentin (NEURONTIN) 300 mg capsule, 300 mg daily at bedtime, Disp: , Rfl:     glimepiride (AMARYL) 4 mg tablet, Take 4 mg by mouth in the morning, Disp: , Rfl:     lisinopril (ZESTRIL) 5 mg tablet, 5 mg daily at bedtime, Disp: , Rfl:     metoprolol succinate (TOPROL-XL) 25 mg 24 hr tablet, 25 mg daily at bedtime, Disp: , Rfl:     nitrofurantoin (MACRODANTIN) 50 mg capsule, Take 1 capsule by mouth daily (Patient not taking: Reported on 2/6/2023), Disp: , Rfl:     potassium chloride (Klor-Con) 10 mEq tablet, 10 mEq in the morning, Disp: , Rfl:     sitaGLIPtin (JANUVIA) 100 mg tablet, Take 100 mg by mouth daily with breakfast, Disp: , Rfl:     triamterene-hydrochlorothiazide (DYAZIDE) 37.5-25 mg per capsule, 1 capsule every morning, Disp: , Rfl:     Current Facility-Administered  Medications:     bupivacaine (PF) (MARCAINE) 0.25 % injection 2 mL, 2 mL, Epidural, Once, Jay Meyer MD    Gadobutrol injection (SINGLE-DOSE) SOLN 1 mL, 1 mL, Intravenous, Once, Jay Meyer MD    methylPREDNISolone acetate (DEPO-MEDROL) injection 80 mg, 80 mg, Epidural, Once, Jay Meyer MD  [4]   Allergies  Allergen Reactions    Iodinated Contrast Media Hives

## 2025-05-28 NOTE — TELEPHONE ENCOUNTER
Procedure canceled today due to blood sugar being 262 in clinic.  Spoke to Dr. Randall and explained that we had to cancel treatment for the second time due to elevated blood sugars.  He will reach out to patient and her son to adjust dosing of her diabetes medications   -3

## 2025-06-05 ENCOUNTER — HOSPITAL ENCOUNTER (OUTPATIENT)
Dept: RADIOLOGY | Facility: CLINIC | Age: 85
End: 2025-06-05
Attending: ANESTHESIOLOGY
Payer: MEDICARE

## 2025-06-05 VITALS
RESPIRATION RATE: 18 BRPM | DIASTOLIC BLOOD PRESSURE: 67 MMHG | SYSTOLIC BLOOD PRESSURE: 129 MMHG | TEMPERATURE: 97.2 F | OXYGEN SATURATION: 96 % | HEART RATE: 73 BPM

## 2025-06-05 PROCEDURE — 62323 NJX INTERLAMINAR LMBR/SAC: CPT | Performed by: ANESTHESIOLOGY

## 2025-06-05 PROCEDURE — A9585 GADOBUTROL INJECTION: HCPCS | Performed by: ANESTHESIOLOGY

## 2025-06-05 RX ORDER — GADOBUTROL 604.72 MG/ML
1 INJECTION INTRAVENOUS ONCE
Status: COMPLETED | OUTPATIENT
Start: 2025-06-05 | End: 2025-06-05

## 2025-06-05 RX ORDER — BUPIVACAINE HCL/PF 2.5 MG/ML
2 VIAL (ML) INJECTION ONCE
Status: COMPLETED | OUTPATIENT
Start: 2025-06-05 | End: 2025-06-05

## 2025-06-05 RX ORDER — METHYLPREDNISOLONE ACETATE 80 MG/ML
80 INJECTION, SUSPENSION INTRA-ARTICULAR; INTRALESIONAL; INTRAMUSCULAR; PARENTERAL; SOFT TISSUE ONCE
Status: COMPLETED | OUTPATIENT
Start: 2025-06-05 | End: 2025-06-05

## 2025-06-05 RX ADMIN — GADOBUTROL 1 ML: 604.72 INJECTION INTRAVENOUS at 10:11

## 2025-06-05 RX ADMIN — BUPIVACAINE HYDROCHLORIDE 2 ML: 2.5 INJECTION, SOLUTION EPIDURAL; INFILTRATION; INTRACAUDAL at 10:11

## 2025-06-05 RX ADMIN — METHYLPREDNISOLONE ACETATE 80 MG: 80 INJECTION, SUSPENSION INTRA-ARTICULAR; INTRALESIONAL; INTRAMUSCULAR; SOFT TISSUE at 10:11

## 2025-06-05 NOTE — H&P
History of Present Illness: The patient is a 84 y.o. female who presents with complaints of lower back pain is here today for a lumbar epidural steroid injection    Past Medical History[1]    Past Surgical History[2]    Current Medications[3]    Allergies[4]    Physical Exam:   Vitals:    06/05/25 0934   BP: 137/74   Pulse: 65   Resp: 18   Temp: (!) 97.2 °F (36.2 °C)   SpO2: 97%     General: Awake, Alert, Oriented x 3, Mood and affect appropriate  Respiratory: Respirations even and unlabored  Cardiovascular: Peripheral pulses intact; no edema  Musculoskeletal Exam: Lower back pain    ASA Score: 3    Patient/Chart Verification  Patient ID Verified: Verbal  ID Band Applied: No  H&P( within 30 days) Verified: To be obtained in the Procedural area  Allergies Reviewed: Yes  Anticoag/NSAID held?: No  Currently on antibiotics?: No  Pre-op Lab/Test Results Available:  (blood sugar 98 today per pt FQ informed)    Assessment: Lumbar radiculopathy    Plan: L5 LESI         [1]   Past Medical History:  Diagnosis Date    Coronary artery disease     Diabetes mellitus (HCC)     Hypertension     Macular degeneration disease     Spinal stenosis    [2]   Past Surgical History:  Procedure Laterality Date    CARDIAC OTHER      CARDIAC SURGERY     [3]   Current Outpatient Medications:     aspirin 81 mg chewable tablet, Chew 81 mg daily at bedtime, Disp: , Rfl:     Empagliflozin (Jardiance) 25 MG TABS, Take 25 mg by mouth every morning, Disp: , Rfl:     gabapentin (NEURONTIN) 300 mg capsule, 300 mg daily at bedtime, Disp: , Rfl:     glimepiride (AMARYL) 4 mg tablet, Take 4 mg by mouth in the morning, Disp: , Rfl:     lisinopril (ZESTRIL) 5 mg tablet, 5 mg daily at bedtime, Disp: , Rfl:     metoprolol succinate (TOPROL-XL) 25 mg 24 hr tablet, 25 mg daily at bedtime, Disp: , Rfl:     nitrofurantoin (MACRODANTIN) 50 mg capsule, Take 1 capsule by mouth daily (Patient not taking: Reported on 2/6/2023), Disp: , Rfl:     potassium chloride  (Klor-Con) 10 mEq tablet, 10 mEq in the morning, Disp: , Rfl:     sitaGLIPtin (JANUVIA) 100 mg tablet, Take 100 mg by mouth daily with breakfast, Disp: , Rfl:     triamterene-hydrochlorothiazide (DYAZIDE) 37.5-25 mg per capsule, 1 capsule every morning, Disp: , Rfl:     Current Facility-Administered Medications:     bupivacaine (PF) (MARCAINE) 0.25 % injection 2 mL, 2 mL, Epidural, Once, Jay Meyer MD    Gadobutrol injection (SINGLE-DOSE) SOLN 1 mL, 1 mL, Other, Once, Jay Meyer MD    methylPREDNISolone acetate (DEPO-MEDROL) injection 80 mg, 80 mg, Epidural, Once, Jay Meyer MD  [4]   Allergies  Allergen Reactions    Iodinated Contrast Media Hives

## 2025-06-05 NOTE — DISCHARGE INSTR - LAB
Epidural Steroid Injection   WHAT YOU NEED TO KNOW:   An epidural steroid injection (TANJA) is a procedure to inject steroid medicine into the epidural space. The epidural space is between your spinal cord and vertebrae. Steroids reduce inflammation and fluid buildup in your spine that may be causing pain. You may be given pain medicine along with the steroids.          ACTIVITY  Do not drive or operate machinery today.  No strenuous activity today - bending, lifting, etc.  You may resume normal activites starting tomorrow - start slowly and as tolerated.  You may shower today, but no tub baths or hot tubs.  You may have numbness for several hours from the local anesthetic. Please use caution and common sense, especially with weight-bearing activities.    CARE OF THE INJECTION SITE  If you have soreness or pain, apply ice to the area today (20 minutes on/20 minutes off).  Starting tomorrow, you may use warm, moist heat or ice if needed.  You may have an increase or change in your discomfort for 36-48 hours after your treatment.  Apply ice and continue with any pain medication you have been prescribed.  Notify the [facility] if you have any of the following: redness, drainage, swelling, headache, stiff neck or fever above 100°F.    SPECIAL INSTRUCTIONS  Our office will contact you in approximately 14 days for a progress report.    MEDICATIONS  Continue to take all routine medications.  Our office may have instructed you to hold some medications.    As no general anesthesia was used in today's procedure, you should not experience any side effects related to anesthesia.     If you are diabetic, the steroids used in today's injection may temporarily increase your blood sugar levels after the first few days after your injection. Please keep a close eye on your sugars and alert the doctor who manages your diabetes if your sugars are significantly high from your baseline or you are symptomatic.     If you have a  problem specifically related to your procedure, please call our office at [phone].  Problems not related to your procedure should be directed to your primary care physician.

## 2025-06-19 ENCOUNTER — TELEPHONE (OUTPATIENT)
Dept: RADIOLOGY | Facility: MEDICAL CENTER | Age: 85
End: 2025-06-19

## (undated) RX ORDER — PREDNISOLONE ACETATE 10 MG/ML: 1 SUSPENSION/ DROPS OPHTHALMIC

## (undated) RX ORDER — BROMFENAC 0.76 MG/ML: 1 SOLUTION/ DROPS OPHTHALMIC TWICE A DAY

## (undated) RX ORDER — LOTEPREDNOL ETABONATE 3.8 MG/G: 1 GEL OPHTHALMIC

## (undated) RX ORDER — HYDROXYZINE HYDROCHLORIDE 25 MG/1: 1 TABLET, FILM COATED ORAL

## (undated) RX ORDER — GANCICLOVIR 1.5 MG/G
1 GEL OPHTHALMIC
Start: 2022-06-16

## (undated) RX ORDER — GANCICLOVIR 1.5 MG/G: 1 GEL OPHTHALMIC